# Patient Record
Sex: FEMALE | Race: WHITE | NOT HISPANIC OR LATINO | Employment: PART TIME | URBAN - METROPOLITAN AREA
[De-identification: names, ages, dates, MRNs, and addresses within clinical notes are randomized per-mention and may not be internally consistent; named-entity substitution may affect disease eponyms.]

---
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
## 2020-03-02 ENCOUNTER — OFFICE VISIT (OUTPATIENT)
Dept: FAMILY MEDICINE CLINIC | Facility: CLINIC | Age: 20
End: 2020-03-02
Payer: COMMERCIAL

## 2020-03-02 VITALS
OXYGEN SATURATION: 98 % | HEIGHT: 66 IN | HEART RATE: 56 BPM | SYSTOLIC BLOOD PRESSURE: 112 MMHG | WEIGHT: 188.2 LBS | RESPIRATION RATE: 18 BRPM | DIASTOLIC BLOOD PRESSURE: 80 MMHG | TEMPERATURE: 97.6 F | BODY MASS INDEX: 30.25 KG/M2

## 2020-03-02 DIAGNOSIS — F41.9 ANXIETY: Primary | ICD-10-CM

## 2020-03-02 DIAGNOSIS — E66.9 OBESITY (BMI 30-39.9): ICD-10-CM

## 2020-03-02 DIAGNOSIS — F32.A DEPRESSIVE DISORDER: ICD-10-CM

## 2020-03-02 DIAGNOSIS — F33.8 SEASONAL AFFECTIVE DISORDER (HCC): ICD-10-CM

## 2020-03-02 PROCEDURE — 1036F TOBACCO NON-USER: CPT | Performed by: FAMILY MEDICINE

## 2020-03-02 PROCEDURE — 99214 OFFICE O/P EST MOD 30 MIN: CPT | Performed by: FAMILY MEDICINE

## 2020-03-02 PROCEDURE — 3008F BODY MASS INDEX DOCD: CPT | Performed by: FAMILY MEDICINE

## 2020-03-02 NOTE — PROGRESS NOTES
BMI Counseling: Body mass index is 30 84 kg/m²  The BMI is above normal  Nutrition recommendations include reducing portion sizes, decreasing overall calorie intake, 3-5 servings of fruits/vegetables daily, reducing fast food intake, consuming healthier snacks and reducing intake of cholesterol  Exercise recommendations include exercising 3-5 times per week

## 2020-03-02 NOTE — PROGRESS NOTES
Assessment/Plan:  Chief Complaint   Patient presents with    Follow-up     Pt presents for a Med check  Patient Instructions   Anxiety and depression and SAD stable and will continue Sertraline 100 mg daily and call if any problems, and recheck in summer 2020  Exercise and eat healthy to help get BMI less than 25  Increase bright lights and take vitamin D3 as directed daily  No problem-specific Assessment & Plan notes found for this encounter  Diagnoses and all orders for this visit:    Anxiety    Depressive disorder    Seasonal affective disorder ([address] )    Obesity (BMI 30-39  9)          Subjective:      Patient ID: Landy Hawley is a 23 y o  female  Follow-up (Pt presents for a Med check  ) No cp or sob, or ha  Takes Sertraline 100 mg daily  Pt  With hx of SAD and anxiety and depression  The following portions of the patient's history were reviewed and updated as appropriate: allergies, current medications, past family history, past medical history, past social history, past surgical history and problem list     Review of Systems   Constitutional:        Obesity   HENT: Negative  Eyes: Negative  Respiratory: Negative  Cardiovascular: Negative  Gastrointestinal: Negative  Endocrine: Negative  Genitourinary: Negative  Musculoskeletal: Negative  Skin: Negative  Allergic/Immunologic: Negative  Neurological: Negative  Hematological: Negative  Psychiatric/Behavioral:        Anxiety and depression and SAD stable on med         Objective:      /80 (BP Location: Left arm, Patient Position: Sitting, Cuff Size: Adult)   Pulse 56   Temp 97 6 °F (36 4 °C) (Temporal)   Resp 18   Ht 5' 5 5" (1 664 m)   Wt 85 4 kg (188 lb 3 2 oz)   SpO2 98%   BMI 30 84 kg/m²          Physical Exam   Constitutional: She is oriented to person, place, and time  She appears well-developed and well-nourished  obesity   HENT:   Head: Normocephalic and atraumatic     Right Ear: External ear normal    Left Ear: External ear normal    Nose: Nose normal    Mouth/Throat: Oropharynx is clear and moist    Eyes: Pupils are equal, round, and reactive to light  Conjunctivae and EOM are normal    Neck: Normal range of motion  Neck supple  Cardiovascular: Normal rate, regular rhythm, normal heart sounds and intact distal pulses  Pulmonary/Chest: Effort normal and breath sounds normal    Musculoskeletal: Normal range of motion  Neurological: She is alert and oriented to person, place, and time  She has normal reflexes  Skin: Skin is warm and dry  Psychiatric: She has a normal mood and affect   Her behavior is normal    Anxiety and depression and SAD stable on med

## 2020-03-02 NOTE — PATIENT INSTRUCTIONS
Anxiety and depression and SAD stable and will continue Sertraline 100 mg daily and call if any problems, and recheck in summer 2020  Exercise and eat healthy to help get BMI less than 25  Increase bright lights and take vitamin D3 as directed daily

## 2020-05-04 DIAGNOSIS — F41.9 ANXIETY: ICD-10-CM

## 2020-05-04 RX ORDER — SERTRALINE HYDROCHLORIDE 100 MG/1
TABLET, FILM COATED ORAL
Qty: 90 TABLET | Refills: 3 | Status: SHIPPED | OUTPATIENT
Start: 2020-05-04 | End: 2021-07-12

## 2020-08-03 ENCOUNTER — OFFICE VISIT (OUTPATIENT)
Dept: FAMILY MEDICINE CLINIC | Facility: CLINIC | Age: 20
End: 2020-08-03
Payer: COMMERCIAL

## 2020-08-03 VITALS
RESPIRATION RATE: 16 BRPM | SYSTOLIC BLOOD PRESSURE: 110 MMHG | TEMPERATURE: 97.3 F | HEIGHT: 66 IN | WEIGHT: 183.6 LBS | DIASTOLIC BLOOD PRESSURE: 64 MMHG | OXYGEN SATURATION: 97 % | BODY MASS INDEX: 29.51 KG/M2 | HEART RATE: 87 BPM

## 2020-08-03 DIAGNOSIS — F41.9 ANXIETY: Primary | ICD-10-CM

## 2020-08-03 DIAGNOSIS — F33.8 SEASONAL AFFECTIVE DISORDER (HCC): ICD-10-CM

## 2020-08-03 DIAGNOSIS — M79.642 LEFT HAND PAIN: ICD-10-CM

## 2020-08-03 DIAGNOSIS — M25.551 RIGHT HIP PAIN: ICD-10-CM

## 2020-08-03 DIAGNOSIS — M25.532 LEFT WRIST PAIN: ICD-10-CM

## 2020-08-03 DIAGNOSIS — F32.A DEPRESSIVE DISORDER: ICD-10-CM

## 2020-08-03 PROCEDURE — 99214 OFFICE O/P EST MOD 30 MIN: CPT | Performed by: FAMILY MEDICINE

## 2020-08-03 PROCEDURE — 1036F TOBACCO NON-USER: CPT | Performed by: FAMILY MEDICINE

## 2020-08-03 PROCEDURE — 3008F BODY MASS INDEX DOCD: CPT | Performed by: FAMILY MEDICINE

## 2020-08-03 RX ORDER — MELOXICAM 7.5 MG/1
7.5 TABLET ORAL DAILY
Qty: 30 TABLET | Refills: 0 | Status: SHIPPED | OUTPATIENT
Start: 2020-08-03 | End: 2020-08-27

## 2020-08-03 NOTE — PATIENT INSTRUCTIONS
Here for right hip flexor straining after running and left wrist and hand pain and worse when doing push ups - consider orthopedic consult if not better  Trial of meloxicam prn pain  Call if not better  Take Sertraline as directed for anxiety and depression and SAD

## 2020-08-03 NOTE — PROGRESS NOTES
Assessment/Plan:  Chief Complaint   Patient presents with    Medication Management     Pt is here for med check      Patient Instructions   Here for right hip flexor straining after running and left wrist and hand pain and worse when doing push ups - consider orthopedic consult if not better  Trial of meloxicam prn pain  Call if not better  Take Sertraline as directed for anxiety and depression and SAD  No problem-specific Assessment & Plan notes found for this encounter  Diagnoses and all orders for this visit:    Anxiety    Depressive disorder    Seasonal affective disorder ([address] )    Right hip pain  -     meloxicam (MOBIC) 7 5 mg tablet; Take 1 tablet (7 5 mg total) by mouth daily    Left hand pain  -     meloxicam (MOBIC) 7 5 mg tablet; Take 1 tablet (7 5 mg total) by mouth daily    Left wrist pain  -     meloxicam (MOBIC) 7 5 mg tablet; Take 1 tablet (7 5 mg total) by mouth daily          Subjective:      Patient ID: Sanket Siddiqui is a 21 y o  female  Medication Management (Pt is here for med check ) takes Sertraline and is doing well at the 100 mg dose  Pt  Is exercising  Right hip flexor has been sore since running  The following portions of the patient's history were reviewed and updated as appropriate: allergies, current medications, past family history, past medical history, past social history, past surgical history and problem list     Review of Systems   Constitutional: Negative  HENT: Negative  Eyes: Negative  Respiratory: Negative  Cardiovascular: Negative  Gastrointestinal: Negative  Endocrine: Negative  Genitourinary: Negative  Musculoskeletal: Negative  Skin: Negative  Allergic/Immunologic: Negative  Neurological: Negative  Hematological: Negative  Psychiatric/Behavioral: Negative            Objective:      /64   Pulse 87   Temp (!) 97 3 °F (36 3 °C) (Temporal)   Resp 16   Ht 5' 5 5"   Wt 83 3 kg (183 lb 9 6 oz)   SpO2 97%   BMI 30 09 kg/m²          Physical Exam   Constitutional: She is oriented to person, place, and time  She appears well-developed  HENT:   Head: Normocephalic and atraumatic  Right Ear: External ear normal    Left Ear: External ear normal    Nose: Nose normal    Eyes: Pupils are equal, round, and reactive to light  Conjunctivae are normal    Neck: Normal range of motion  Neck supple  Cardiovascular: Normal rate, regular rhythm and normal heart sounds  Pulmonary/Chest: Effort normal and breath sounds normal    Musculoskeletal: Normal range of motion  Neurological: She is alert and oriented to person, place, and time  She has normal reflexes  Skin: Skin is warm and dry     Psychiatric: Her behavior is normal

## 2020-08-27 DIAGNOSIS — M25.532 LEFT WRIST PAIN: ICD-10-CM

## 2020-08-27 DIAGNOSIS — M79.642 LEFT HAND PAIN: ICD-10-CM

## 2020-08-27 DIAGNOSIS — M25.551 RIGHT HIP PAIN: ICD-10-CM

## 2020-08-27 RX ORDER — MELOXICAM 7.5 MG/1
TABLET ORAL
Qty: 30 TABLET | Refills: 0 | Status: SHIPPED | OUTPATIENT
Start: 2020-08-27 | End: 2021-07-12

## 2020-10-21 ENCOUNTER — TELEPHONE (OUTPATIENT)
Dept: FAMILY MEDICINE CLINIC | Facility: CLINIC | Age: 20
End: 2020-10-21

## 2020-10-21 ENCOUNTER — APPOINTMENT (OUTPATIENT)
Dept: URGENT CARE | Age: 20
End: 2020-10-21
Payer: COMMERCIAL

## 2020-10-21 ENCOUNTER — TRANSCRIBE ORDERS (OUTPATIENT)
Dept: URGENT CARE | Age: 20
End: 2020-10-21

## 2020-10-21 ENCOUNTER — APPOINTMENT (OUTPATIENT)
Dept: LAB | Age: 20
End: 2020-10-21
Payer: COMMERCIAL

## 2020-10-21 DIAGNOSIS — Z00.8 SPECIAL EXAM: ICD-10-CM

## 2020-10-21 DIAGNOSIS — Z00.8 SPECIAL EXAM: Primary | ICD-10-CM

## 2020-10-21 PROCEDURE — 86480 TB TEST CELL IMMUN MEASURE: CPT

## 2020-10-21 PROCEDURE — 36415 COLL VENOUS BLD VENIPUNCTURE: CPT

## 2020-10-22 LAB
GAMMA INTERFERON BACKGROUND BLD IA-ACNC: 0.02 IU/ML
M TB IFN-G BLD-IMP: NEGATIVE
M TB IFN-G CD4+ BCKGRND COR BLD-ACNC: 0 IU/ML
M TB IFN-G CD4+ BCKGRND COR BLD-ACNC: 0 IU/ML
MITOGEN IGNF BCKGRD COR BLD-ACNC: >10 IU/ML

## 2020-12-08 ENCOUNTER — OFFICE VISIT (OUTPATIENT)
Dept: FAMILY MEDICINE CLINIC | Facility: CLINIC | Age: 20
End: 2020-12-08
Payer: COMMERCIAL

## 2020-12-08 VITALS
BODY MASS INDEX: 30.63 KG/M2 | RESPIRATION RATE: 17 BRPM | OXYGEN SATURATION: 98 % | HEART RATE: 68 BPM | SYSTOLIC BLOOD PRESSURE: 102 MMHG | DIASTOLIC BLOOD PRESSURE: 78 MMHG | HEIGHT: 66 IN | TEMPERATURE: 97 F | WEIGHT: 190.6 LBS

## 2020-12-08 DIAGNOSIS — F41.9 ANXIETY: ICD-10-CM

## 2020-12-08 DIAGNOSIS — L98.9 FACIAL SKIN LESION: Primary | ICD-10-CM

## 2020-12-08 DIAGNOSIS — F33.8 SEASONAL AFFECTIVE DISORDER (HCC): ICD-10-CM

## 2020-12-08 DIAGNOSIS — F32.A DEPRESSIVE DISORDER: ICD-10-CM

## 2020-12-08 PROCEDURE — 99213 OFFICE O/P EST LOW 20 MIN: CPT | Performed by: FAMILY MEDICINE

## 2020-12-08 PROCEDURE — 1036F TOBACCO NON-USER: CPT | Performed by: FAMILY MEDICINE

## 2020-12-08 PROCEDURE — 3008F BODY MASS INDEX DOCD: CPT | Performed by: FAMILY MEDICINE

## 2020-12-08 RX ORDER — MOMETASONE FUROATE 50 UG/1
2 SPRAY, METERED NASAL DAILY
COMMUNITY
Start: 2020-09-21

## 2020-12-08 RX ORDER — ALBUTEROL SULFATE 90 UG/1
AEROSOL, METERED RESPIRATORY (INHALATION)
COMMUNITY
Start: 2020-09-21

## 2020-12-08 RX ORDER — OLOPATADINE HYDROCHLORIDE 1 MG/ML
SOLUTION/ DROPS OPHTHALMIC
COMMUNITY
Start: 2020-09-21

## 2020-12-28 ENCOUNTER — IMMUNIZATIONS (OUTPATIENT)
Dept: FAMILY MEDICINE CLINIC | Facility: HOSPITAL | Age: 20
End: 2020-12-28
Payer: COMMERCIAL

## 2020-12-28 DIAGNOSIS — Z23 ENCOUNTER FOR IMMUNIZATION: ICD-10-CM

## 2020-12-28 PROCEDURE — 0001A SARS-COV-2 / COVID-19 MRNA VACCINE (PFIZER-BIONTECH) 30 MCG: CPT

## 2020-12-28 PROCEDURE — 91300 SARS-COV-2 / COVID-19 MRNA VACCINE (PFIZER-BIONTECH) 30 MCG: CPT

## 2021-01-18 ENCOUNTER — IMMUNIZATIONS (OUTPATIENT)
Dept: FAMILY MEDICINE CLINIC | Facility: HOSPITAL | Age: 21
End: 2021-01-18

## 2021-01-18 DIAGNOSIS — Z23 ENCOUNTER FOR IMMUNIZATION: Primary | ICD-10-CM

## 2021-01-18 PROCEDURE — 0002A SARS-COV-2 / COVID-19 MRNA VACCINE (PFIZER-BIONTECH) 30 MCG: CPT

## 2021-01-18 PROCEDURE — 91300 SARS-COV-2 / COVID-19 MRNA VACCINE (PFIZER-BIONTECH) 30 MCG: CPT

## 2021-04-29 LAB
EXTERNAL CHLAMYDIA RESULT: NEGATIVE
N GONORRHOEA RRNA SPEC QL PROBE: NEGATIVE

## 2021-05-25 ENCOUNTER — TELEPHONE (OUTPATIENT)
Dept: PSYCHIATRY | Facility: CLINIC | Age: 21
End: 2021-05-25

## 2021-05-25 ENCOUNTER — TELEPHONE (OUTPATIENT)
Dept: FAMILY MEDICINE CLINIC | Facility: CLINIC | Age: 21
End: 2021-05-25

## 2021-05-25 DIAGNOSIS — F32.A DEPRESSIVE DISORDER: ICD-10-CM

## 2021-05-25 DIAGNOSIS — F41.9 ANXIETY: Primary | ICD-10-CM

## 2021-05-25 NOTE — TELEPHONE ENCOUNTER
Pt called looking to set up w/ a psychiatrist  I informed pt  call ANA ROSA Alvarez to put in a referral in the sytem   Once in I will forward information to Intake

## 2021-05-25 NOTE — TELEPHONE ENCOUNTER
Patient is looking for any recommendations for Psychiatrist's, please advise patient at [phone]  none

## 2021-05-26 ENCOUNTER — TELEPHONE (OUTPATIENT)
Dept: PSYCHIATRY | Facility: CLINIC | Age: 21
End: 2021-05-26

## 2021-05-27 ENCOUNTER — TELEPHONE (OUTPATIENT)
Dept: PSYCHIATRY | Facility: CLINIC | Age: 21
End: 2021-05-27

## 2021-05-27 NOTE — TELEPHONE ENCOUNTER
Patient called and would like to set up an apt she has a referral on file can you please give her a call thank you

## 2021-06-09 ENCOUNTER — TELEPHONE (OUTPATIENT)
Dept: PSYCHIATRY | Facility: CLINIC | Age: 21
End: 2021-06-09

## 2021-06-09 NOTE — TELEPHONE ENCOUNTER
Mom called and would like to set up an apt for Ben Marie I did make her aware that intake will need to speak with the patient to schedule the apt she said you can contact her before 2:00

## 2021-06-14 ENCOUNTER — TELEPHONE (OUTPATIENT)
Dept: PSYCHIATRY | Facility: CLINIC | Age: 21
End: 2021-06-14

## 2021-06-14 NOTE — TELEPHONE ENCOUNTER
Behavorial Health Outpatient Intake Questions    Referred by: PCP    Please advised interviewee that they need to answer all questions truthfully to allow for best care and any misrepresentations of information may affect their ability to be seen at this clinic   => Was this discussed? Yes     [facility]     Presenting Problem (in patient's words): depression, mood swings, episodes of "not being myself"  Are there any developmental disabilities? ? If yes, can they speak to you on the phone? If they are too limited to speak to you on phone, refer out No    Are you taking any psychiatric medications? No    => If yes, who prescribes? If yes, are they injectable medications? Does the patient have a language barrier or hearing impairment? No    Have you been treated at [facility] by a therapist or a doctor in the past? If yes, who? No    Has the patient been hospitalized for mental health? No   If yes, how long ago was last hospitalization and where was it? Do you actively use alcohol or marijuana or illegal substances? If yes, what and how much - refer out to Drug and alcohol treatment if use is excessive or daily use of illegal substances No concerns of substance abuse are reported  Do you have a community treatment team or ? No    Legal History-     Does the patient have any history of arrests, assisted/halfway time, or DUIs? No  If Yes-  1) What types of charges? 2) When were they last incarcerated? 3) Are they currently on parole or probation? Minor Child-    Who has custody of the child? Is there a custody agreement? If there is a custody agreement remind parent that they must bring a copy to the first appt or they will not be seen       Intake Team, please check with provider before scheduling if flags come up such as:  - complex case  - legal history (other than DUI)  - communication barrier concerns are present  - if, in your judgment, this needs further review    ACCEPTED as a patient Yes  => Appointment Date: 07/12/2021 w/ DR Ubaldo Coopering    Referred Elsewhere? No    Name of Insurance Co: [employer] [place] [address] [place] ID# [id number]  [id number] Phone #  If ins is primary or secondary  If patient is a minor, parents information such as Name, D  O B of guarantor

## 2021-07-12 ENCOUNTER — OFFICE VISIT (OUTPATIENT)
Dept: PSYCHIATRY | Facility: CLINIC | Age: 21
End: 2021-07-12
Payer: COMMERCIAL

## 2021-07-12 VITALS
DIASTOLIC BLOOD PRESSURE: 71 MMHG | BODY MASS INDEX: 27.58 KG/M2 | WEIGHT: 182 LBS | HEIGHT: 68 IN | SYSTOLIC BLOOD PRESSURE: 112 MMHG

## 2021-07-12 DIAGNOSIS — E55.9 VITAMIN D DEFICIENCY: ICD-10-CM

## 2021-07-12 DIAGNOSIS — F17.200 NICOTINE USE DISORDER: ICD-10-CM

## 2021-07-12 DIAGNOSIS — F33.0 MAJOR DEPRESSIVE DISORDER, RECURRENT, MILD (HCC): Primary | ICD-10-CM

## 2021-07-12 DIAGNOSIS — F42.9 OBSESSIVE-COMPULSIVE DISORDER, UNSPECIFIED TYPE: ICD-10-CM

## 2021-07-12 PROCEDURE — 90792 PSYCH DIAG EVAL W/MED SRVCS: CPT | Performed by: STUDENT IN AN ORGANIZED HEALTH CARE EDUCATION/TRAINING PROGRAM

## 2021-07-12 RX ORDER — DESVENLAFAXINE 50 MG/1
50 TABLET, EXTENDED RELEASE ORAL DAILY
Qty: 30 TABLET | Refills: 1 | Status: SHIPPED | OUTPATIENT
Start: 2021-07-12 | End: 2021-07-19

## 2021-07-12 NOTE — PSYCH
Name and Date of Birth:  Kendy Roque 24 y o  2000    Date of Treatment Plan: July 12, 2021    Diagnosis/Diagnoses:     1  Major depressive disorder, recurrent, mild (HCC)    2  Obsessive-compulsive disorder, unspecified type    3  Nicotine use disorder    4  Vitamin D deficiency      Strengths/Personal Resources for Self-Care: ability to adapt to life changes, ability to communicate well, ability to listen, ability to reason, average or above intelligence, financial means, general fund of knowledge, good physical health, good understanding of illness, independence, ability to negotiate basic needs, self-reliance, well educated, willingness to work on problems, work skills  Area/Areas of need (in own words): depressive symptoms, mood swings, obsessive-compulsive symptoms  1  Long Term Goal: decrease depression, improve mood stability, decrease obsessive thoughts  Target Date: 6 months - 1/12/2022  Person/Persons responsible for completion of goal: Pedro Luis Connor    2  Short Term Objective (s) - How will we reach this goal?:     A  Provider new recommended medication/dosage changes and/or continue medication(s): continue current medications as prescribed  B  Increase socialization with peers  C   Attend medication management appointments regularly  Target Date: 6 months - 1/12/2022  Person/Persons Responsible for Completion of Goal: Pedro Luis Connor     Progress Towards Goals: initiating treatment    Treatment Modality: medication management every 1 month    Review due 90 to 120 days from date of this plan: 4 months - 11/12/2021  Expected length of service: ongoing treatment unless revised    My Physician/PA/NP and I have developed this plan together and I agree to work on the goals and objectives  I understand the treatment goals that were developed for my treatment      Signature:       Date and time:  Signature of parent/guardian if under age of 15 years: Date and time:  Signature of provider:      Date and time:  Signature of Supervising Physician:    Date and time: 7/12/2021      Andrew Edwards DO

## 2021-07-12 NOTE — PSYCH
Outpatient Psychiatry Intake Exam       PCP: Millie Villavicencio DO    Referral source: PCP    Identifying information:  Johnson Galeas is a 24 y o  female with a psychiatric history of depression and anxiety here for evaluation and determination of mental health management needs  Sources of information:   Information for this evaluation was gathered through direct interview with the patient  Confidentiality discussion: Limits of confidentiality in cases of safety concerns involving self and others as well as this physician's role as a mandatory  of abuse  They voiced understanding and a desire to continue with the evaluation  SUBJECTIVE     Chief Complaint / reason for visit: " Feeling depressed with mood swings "    History of Present Illness:    Johnson Galeas is a 24 y o  female with a psychiatric history of depression and anxiety and medical history of hyperhidrosis, kyphosis, and vitamin D deficiency who presents here for evaluation and determination of mental health management needs  Patient reports increased depression, mood swings, and obsessive thinking which has been worsening since November when she discontinued her Zoloft that she had been on for approximately 4 years as it was making her feel like a zombie and motions  Reports that mood swings began to get worse around more urge in she had 1 episode of impulsively breaking up with her boyfriend of 4 years over nothing which she later regretted and subsequently restarted their relationship  Patient denies ever requiring inpatient psychiatric hospitalization or seeing an outpatient psychiatrist   Does state that she was in therapy 3 years ago for a period of 3 months which she did not find to be helpful  Patient denies any history of suicide attempts or history of self-harm  Patient's only psychotropic medication she has ever been on is Zoloft and is interested in trying a new medication  Patient states that her current mood is regular    Does admit to depression off and on over the last 2 weeks  Does admit to feelings of hopelessness and helplessness but denies any feelings of worthlessness or guilt  Does report good quantity of sleep of approximately 8 hours per night but with difficulty with initiation and has a requirement of having a video playing prior to going to sleep  Does admit to significant anhedonia and not enjoying activities she used to which include cooking, baking, playing with her dog, and spending time with her boyfriend and mother  Does admit to decreased energy level and decreased concentration with school this year that she attends online and has been doing so for the past 5 years  Denies any changes in memory, appetite, or weight  Patient denies any suicidal ideations or homicidal ideations  Denies any auditory visual hallucinations now or in the past   Does admit to having some periods of distractibility with racing thoughts, irritability, euphoria, and increased goal-directed activity for a period of 3 days at a time  Also admits to significant OCD like symptoms such as brushing her teeth and washing her face at night in the bathroom with the door closed  If this ritual is interrupted she must start all over again and reports that she freaks out at whoever interrupts her  Also reports a requirement of needing to check her car 3 times after leaving at  Indicates there are other episodes of cleaning, checking the stove, and locking doors which she must engage in or she is unable to control subsequent anxiety from this  Denies anxiety in general or history of panic attacks outside of these performed rituals  Patient denies any history of abuse, trauma, or PTSD symptoms  Denies any eating disorders  Denies any history of seizures or head trauma  Patient has a boyfriend of 4 years  Patient lives in [place], [place] with her mother in house    Reports that she has 2 half brothers ages 27 and 35 whom she has a rough relationship with an generally coexist with them    Does report having a close relationship with younger brother for a period of time prior to him moving to [place] which is now less close  Patient is a full-time student at [employer] where she is in a pre PA program in biology and in her senior year  Patient has no children of her own  Reports that her father left the family when she was 3to 1years old and now lives in [place] whom she has limited contact with and everybody in the family reportedly does not get along with  Reports she sees her father every 2-3 months  Patient denies legal history  Denies  history or access to firearms  Family psychiatric history significant for father with depression and opiate use disorder/alcohol use disorder  Reports all her grand parents have alcohol use disorder  Reports that she drinks occasionally approximately 3 [place] drinks once a week  Denies any blackouts, DUIs, need for detox/rehab  Reports she did smoke marijuana in high school but has not done so since that time  Denies any other illicit drug use  Denies tobacco use  Does admit to significant vaping throughout the day for the last 5 years  States that she discontinued this approximately 1 month ago which has been difficult for her to deal with  Reports drinking approximately 2 cups of coffee per day  Onset of symptoms was a few months ago with gradually worsening course since that time  Stressors: 1) unstable relationship with father 2) unstable relationship with brothers 3) death anxiety 4) anniversary of grandmother's passing 1 year ago    HPI ROS:  Medication Side Effects: none  Depression: 6-9 /10 (10 worst)  Anxiety: 4 /10 (10 worst)  Safety (SI, HI, other): Denies SI/HI/AVH  Sleep (NM = Nightmares): 8+ hours but difficulty with initiation and requiring a video to be played  Energy:  Decreased  Appetite:   Within normal limits  Weight Change:  Denies    PHQ-9 Follow-up    Little interest or pleasure in doing things: 2 - more than half the days  Feeling down, depressed, or hopeless: 3 - nearly every day  Trouble falling or staying asleep, or sleeping too much: 3 - nearly every day  Feeling tired or having little energy: 2 - more than half the days  Poor appetite or overeatin - not at all  Feeling bad about yourself - or that you are a failure or have let yourself or your family down: 1 - several days  Trouble concentrating on things, such as reading the newspaper or watching television: 1 - several days  Moving or speaking so slowly that other people could have noticed  Or the opposite - being so fidgety or restless that you have been moving around a lot more than usual: 0 - not at all  Thoughts that you would be better off dead, or of hurting yourself in some way: 0 - not at all  PHQ-2 Score: 5  PHQ-9 Score: 12         PHQ-9 Score (since 2021)     PHQ-9 Score  12          In terms of depression, the patient endorses loss of interests/pleasure, depressed mood, loss of energy, trouble concentrating, change in psychomotor activity, and hopelessness  In terms of bipolar disorder, the patient endorses history of periods of elevated mood, history of periods of irritable mood, history of mood swings, but no clear history of full hypomanic, manic or mixed episodes, lasting several days in a row   Symptoms include inflated self-esteem or grandiosity , Irritability, flight of ideas/racing thoughts , distractibility, increased goal-directed behavior, psychomotor agitation and symptoms have been significant and present for 3 days    BERTA symptoms: excessive worry more days than not for longer than 3 months, difficulty concentrating, irritable and restlessness/keyed up  Panic Disorder symptoms: no symptoms suggestive of panic disorder  Social Anxiety symptoms: no symptoms suggestive of social anxiety  OCD Symptoms: (Obsession 2/2) AND the individual trys to ingnore or suppress these with some other thought or action, (Compulsion 2/2) AND these are aimed to reduce anxiety or distress or some dreaded event  HOWEVER, thees are not linked realistically or are clearly excessive, these obessions or compulsions are time consuming or cause significant distress or impairment, checking, rituals  Eating Disorder symptoms: no historical or current eating disorder  no binge eating disorder; no anorexia nervosa  no symptoms of bulimia    In terms of PTSD, the patient endorses exposure to trauma involving: Denies; intrusive symptoms including (1+): no intrusive symptoms; avoidance symptoms including (1+): no avoidance symptoms; Negative alterations including (2+): no negative alteration symptoms; hyperarousal symptoms including (2+): no arousal symptoms  In terms of psychotic symptoms, the patient reports no psychotic symptoms now or in the past     Past Psychiatric History  Previous diagnoses include: Anxiety and depression  Prior outpatient psychiatric treatment: None  Prior therapy: 3 months, 3 years ago  Disliked it  Prior inpatient psychiatric treatment: Denies  Prior suicide attempts: Denies  Prior self harm: Denies  Prior violence or aggression: Denies    Previous psychotropic medication trials:     Antidepressants: Zoloft    Mood Stabilizers: None    Anxiolytics: None    Typical antipsychotics: None    Atypical antipsychotics: None    Hypnotics/sleep aids: None    Social History:    Childhood was described as "good, mom made up for dad"  During childhood, mom supported patient while father abandoned her and participated in drug activity  They have 0 sister(s) and 2 half brother(s)  Patient is 3 in birth order    Abuse/neglect: none    As far as the patient (or present family member) is aware, overall childhood development: Patient does ascribe to normal developmental including childhood as well as regular educational course overall (eg no IEP, special education)      Education level: Senior year of college at [employer]   Current occupation: Student and part-time CNA  Marital status: Single, Boyfriend  Children: None  Current Living Situation: the patient currently lives with mother in [place], [place]   Social support: Mom, few friends    [employer] Affiliation: None   experience: Denies  Legal history: Denies  Access to Guns: Denies    Substance use and treatment:  Tobacco use: Denies but does use vape for past 5 years  Quit 1 month ago  Caffeine Use: 2 cups/day  ETOH use: occasional   Other substance use: Denies  Endorses previous experimentation with: MJ in high school    Longest clean time: 3 months clean from vaping  History of Inpatient/Outpatient rehabilitation program: no      Traumatic History:     Abuse: none  Other Traumatic Events: none     Family Psychiatric History:     Psychiatric Illness:  Father - depression  Substance Abuse: All grandparents alcohol use disorder  Suicide Attempts:  no family history of suicide attempts    Family History   Problem Relation Age of Onset    Depression Father             Past Medical / Surgical History:    Current PCP: Lance Munroe DO   Other providers include: Unknown    Patient Active Problem List   Diagnosis    Hyperhidrosis    Kyphosis    Seasonal affective disorder ([facility] [address] )    Vitamin D deficiency    Chronic nonintractable headache    Obsessive compulsive disorder    Nicotine use disorder       Past Medical History-  Past Medical History:   Diagnosis Date    Allergy     last assessed: 12/4/2013    Anxiety     Depression     Dysuria     last assessed: 3/5/2014    Kyphosis, acquired     last assessed: 1/17/2018    Murmur     last assessed: 6/24/2014          History of Seizures: no  History of Head injury-LOC-Concussion: no    Past Surgical History-  No past surgical history on file         Allergies: Pineapple and Sulfa  Allergies   Allergen Reactions    Pineapple - Food Allergy      Other reaction(s): Tounge and throat swell    Sulfa Antibiotics Rash and Hives       Recent labs:  No visits with results within 1 Month(s) from this visit  Latest known visit with results is:   Appointment on 10/21/2020   Component Date Value    QFT Nil 10/21/2020 0 02     QFT TB1-NIL 10/21/2020 0 00     QFT TB2-NIL 10/21/2020 0 00     QFT Mitogen-NIL 10/21/2020 >10 00     QFT Final Interpretation 10/21/2020 Negative      Labs were reviewed and discussed with patient    Medical Review Of Systems:    Patient admits to no physical complaints; otherwise A comprehensive review of systems was negative  Medications:  Current Outpatient Medications on File Prior to Visit   Medication Sig Dispense Refill    albuterol (PROVENTIL HFA,VENTOLIN HFA) 90 mcg/act inhaler INHALE 2 PUFFS 20 MIN PRIOR TO EXERCISE AND UPTO EVERY 4 HOURS AS NEEDED      mometasone (NASONEX) 50 mcg/act nasal spray 2 sprays daily      olopatadine (PATANOL) 0 1 % ophthalmic solution instill 1 drop into both eyes twice a day if needed      [DISCONTINUED] meloxicam (MOBIC) 7 5 mg tablet take 1 tablet by mouth once daily with food or milk (Patient not taking: Reported on 12/8/2020) 30 tablet 0    [DISCONTINUED] sertraline (ZOLOFT) 100 mg tablet take 1 tablet by mouth once daily 90 tablet 3     No current facility-administered medications on file prior to visit  Medication Compliant? Yes    All current active medications have been reviewed      Objective     OBJECTIVE     /71   Ht 5' 8 11" (1 73 m)   Wt 82 6 kg (182 lb)   BMI 27 58 kg/m²     MENTAL STATUS EXAM  Appearance:  dressed casually, overweight, looks older than stated age   Behavior:  Pleasant & cooperative, guarded, fair eye contact, psychomotor retardation   Speech:  soft, monotone   Mood:  regular   Affect:  mood incongruent, depressed, constricted, anxious   Language: intact and appropriate for age, education, and intellect   Thought Process:  Linear and goal directed   Associations: intact associations   Thought Content:  negative thinking and cognitive distortions, obsessive   Perceptual Disturbances: no auditory or visual hallcunations   Risk Potential / Abnormal Thoughts: Suicidal ideation - None  Homicidal ideation - None  Potential for aggression - No       Consciousness:  Alert & Awake   Sensorium:  Grossly oriented   Attention: attention span and concentration are age appropriate   Intellect: grossly normal   Fund of Knowledge:  Memory: awareness of current events: yes  recent and remote memory grossly intact   Insight:  limited   Judgment: improving   Muscle Strength Muscle Tone: normal  normal   Gait/Station: normal gait/station with good balance   Motor Activity: no abnormal movements     Pain none   Pain Scale 0     IMPRESSIONS/FORMULATION          Diagnoses and all orders for this visit:    Major depressive disorder, recurrent, mild (HCC)  -     Ambulatory referral to Psychiatry  -     desvenlafaxine succinate (PRISTIQ) 50 mg 24 hr tablet; Take 1 tablet (50 mg total) by mouth daily  -     Vitamin D 25 hydroxy; Future    Obsessive-compulsive disorder, unspecified type    Nicotine use disorder    Vitamin D deficiency        1  Major depressive disorder, recurrent, mild (HCC)    2  Obsessive-compulsive disorder, unspecified type    3  Nicotine use disorder    4  Vitamin D deficiency        Confidential Assessment:      Luz Maria Flores is a 24 y o  female who presents with symptoms supporting the aforementioned  Differential includes Major Depressive Disorder, recurrent, mild, OCD uspecified, nicotine use disorder, bipolar 2 disorder, and ADHD  I do not believe the patient has bipolar disorder as most of her symptoms can be explained by her OCD or depressive symptoms  ADHD remains on the differential due to concentration problems at school and further assessment of this will be done moving forward   Pristiq is a suitable medication to start with this patient as it is indicated for depression and OCD with easy dosing and minimal risk of discontinuation  Patient declined nicotine replacement therapy  Will order outpatient vitamin D level to check prior to next patient appointment  Suicide / Homicide / Safety risk assessment:    Risk of Harm to Self:  The following ratings are based on assessment at the time of the interview  Demographic risk factors include: , never , age: young adult (15-24)  Historical Risk Factors include: chronic depressive symptoms, history of substance use  Recent Specific Risk Factors include: current depressive symptoms, unstable mood, hopelessness  Protective Factors: ability to adapt to change, able to manage anger well, compliant with mental health treatment, effective coping skills, good self-esteem, stable job, sobriety  Weapons: none  The following steps have been taken to ensure weapons are properly secured: not applicable  Based on today's assessment, Katherine Mayen presents the following risk of harm to self: minimal    Risk of Harm to Others:   No HI  The following interventions are recommended: no intervention changes needed  Although patient's acute lethality risk is LOW, long-term/chronic lethality risk is mildly elevated given depressive symptoms and tobacco use disorder but without any prior history of suicidal thoughts/actions  However, at the current moment, Katherine Mayen is future-oriented, forward-thinking, and demonstrates ability to act in a self-preserving manner as evidenced by volitionally presenting to the clinic today, seeking treatment  Additionally, Katherine Mayen sits throughout the assessment wearing personal protective gear (ie mask) in the context of an ongoing viral pandemic, suggesting a will and desire to live  At this juncture, inpatient hospitalization is not currently warranted   To mitigate future risk, patient should adhere to treatment recommendations, avoid alcohol/illicit substance use, utilize community-based resources and familiar support,

## 2021-07-13 ENCOUNTER — TELEPHONE (OUTPATIENT)
Dept: PSYCHIATRY | Facility: CLINIC | Age: 21
End: 2021-07-13

## 2021-07-13 NOTE — TELEPHONE ENCOUNTER
Prior auth submitted to [facility] via [employer], I called and informed Kandi Chao prior [facility] was submitted and I will notify her once I receive the response

## 2021-07-16 NOTE — TELEPHONE ENCOUNTER
Received fax from [address] of prior authorization for Desvenlafaxine  They require one more trial of an alternative antidepressant         Please review

## 2021-07-19 RX ORDER — VENLAFAXINE HYDROCHLORIDE 75 MG/1
75 CAPSULE, EXTENDED RELEASE ORAL DAILY
Qty: 30 CAPSULE | Refills: 0 | Status: SHIPPED | OUTPATIENT
Start: 2021-07-19 | End: 2021-08-16

## 2021-07-19 RX ORDER — VENLAFAXINE HYDROCHLORIDE 37.5 MG/1
37.5 CAPSULE, EXTENDED RELEASE ORAL DAILY
Qty: 30 CAPSULE | Refills: 0 | Status: SHIPPED | OUTPATIENT
Start: 2021-07-19 | End: 2021-07-19

## 2021-07-19 NOTE — TELEPHONE ENCOUNTER
Patient was contacted about the denial of insurance for Pristiq and was agreeable to starting on Effexor instead due to generic nature and affordability  Did review risk for discontinuation syndrome and side effect profile with patient which is similar to Pristiq  She will  medications at her pharmacy

## 2021-07-20 ENCOUNTER — APPOINTMENT (OUTPATIENT)
Dept: LAB | Facility: CLINIC | Age: 21
End: 2021-07-20
Payer: COMMERCIAL

## 2021-07-20 DIAGNOSIS — F33.0 MAJOR DEPRESSIVE DISORDER, RECURRENT, MILD (HCC): ICD-10-CM

## 2021-07-20 LAB — 25(OH)D3 SERPL-MCNC: 31.8 NG/ML (ref 30–100)

## 2021-07-20 PROCEDURE — 36415 COLL VENOUS BLD VENIPUNCTURE: CPT

## 2021-07-20 PROCEDURE — 82306 VITAMIN D 25 HYDROXY: CPT

## 2021-07-22 ENCOUNTER — TELEPHONE (OUTPATIENT)
Dept: FAMILY MEDICINE CLINIC | Facility: CLINIC | Age: 21
End: 2021-07-22

## 2021-08-16 ENCOUNTER — OFFICE VISIT (OUTPATIENT)
Dept: PSYCHIATRY | Facility: CLINIC | Age: 21
End: 2021-08-16
Payer: COMMERCIAL

## 2021-08-16 ENCOUNTER — TELEPHONE (OUTPATIENT)
Dept: PSYCHIATRY | Facility: CLINIC | Age: 21
End: 2021-08-16

## 2021-08-16 VITALS — SYSTOLIC BLOOD PRESSURE: 120 MMHG | DIASTOLIC BLOOD PRESSURE: 84 MMHG | WEIGHT: 177 LBS | BODY MASS INDEX: 26.83 KG/M2

## 2021-08-16 DIAGNOSIS — F33.9 DEPRESSION, RECURRENT (HCC): ICD-10-CM

## 2021-08-16 DIAGNOSIS — F33.0 MAJOR DEPRESSIVE DISORDER, RECURRENT, MILD (HCC): Primary | ICD-10-CM

## 2021-08-16 DIAGNOSIS — F42.9 OBSESSIVE-COMPULSIVE DISORDER, UNSPECIFIED TYPE: ICD-10-CM

## 2021-08-16 PROCEDURE — 99215 OFFICE O/P EST HI 40 MIN: CPT | Performed by: PSYCHIATRY & NEUROLOGY

## 2021-08-16 PROCEDURE — 3725F SCREEN DEPRESSION PERFORMED: CPT | Performed by: PSYCHIATRY & NEUROLOGY

## 2021-08-16 PROCEDURE — 1036F TOBACCO NON-USER: CPT | Performed by: PSYCHIATRY & NEUROLOGY

## 2021-08-16 RX ORDER — VENLAFAXINE HYDROCHLORIDE 75 MG/1
150 CAPSULE, EXTENDED RELEASE ORAL DAILY
Qty: 30 CAPSULE | Refills: 1 | Status: SHIPPED | OUTPATIENT
Start: 2021-08-16 | End: 2021-09-14

## 2021-08-16 NOTE — PSYCH
MEDICATION MANAGEMENT NOTE        [address]      Name and Date of Birth:  Amina Morales 24 y o  2000    Date of Visit: August 16, 2021    SUBJECTIVE:  CC: Dotty Home presents today for follow up with OCD and MDD, recurrent, mild  Patient does report some mild improvement with her energy and receiving "small boost"  from the medications but does state that her anxiety and depression have mostly remained the same  Endorses that concentration continues to be a problem but has not caused overtly negative consequences in her life since it is summer and she has not needed the ability to concentrate like she suspects she will need in the next couple months  States that she is moving back to school in to her own apartment in 5 days  States that this is "bitter sweet" as her summer has concluded  States future plans of wanting to obtain a physician's assistance license and going to a post baccalaureate program   Reports that 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 and she is now transitioning to per [employer] shifts  Reports that her OCD symptoms have not been good as she continues with skin picking and some hair pulling although she does not show obvious excoriations  Reports that she no longer feels like a zombie on Zoloft and denies any overt side effects to Effexor  Reports that she has continued her cessation of smoking but does report some anxiety about possibly starting if she gets anxious at school  Reports that she would like to start psychotherapy when this is available and agrees to being on wait list     Dotty Russell denies any side effects from medications unless noted above    Since our last visit, overall symptoms have been gradually improving       HPI ROS:       PHQ-9 Follow-up    Little interest or pleasure in doing things: 2 - more than half the days  Feeling down, depressed, or hopeless: 3 - nearly every day  Trouble falling or staying asleep, or sleeping too much: 2 - more than half the days  Feeling tired or having little energy: 2 - more than half the days  Poor appetite or overeatin - not at all  Feeling bad about yourself - or that you are a failure or have let yourself or your family down: 0 - not at all  Trouble concentrating on things, such as reading the newspaper or watching television: 2 - more than half the days  Moving or speaking so slowly that other people could have noticed  Or the opposite - being so fidgety or restless that you have been moving around a lot more than usual: 0 - not at all  Thoughts that you would be better off dead, or of hurting yourself in some way: 0 - not at all  PHQ-2 Score: 5  PHQ-9 Score: 11         PHQ-9 Score (since 2021)     PHQ-9 Score  11            BERTA-7 Flowsheet Screening      Most Recent Value   Over the last 2 weeks, how often have you been bothered by any of the following problems? Feeling nervous, anxious, or on edge  1   Not being able to stop or control worrying  2   Worrying too much about different things  3   Trouble relaxing  1   Being so restless that it is hard to sit still  2   Becoming easily annoyed or irritable  3   Feeling afraid as if something awful might happen  1   BERTA-7 Total Score  13              HPI ROS:             ('was' notes: recent => remote)  Medication Side Effects:  Denies  None   Depression (10 worst): 8 (Was 6-9)   Anxiety (10 worst): 7 (Was 4)   Safety concerns (SI, HI, etc): Denies SI/HI/AVH (Was Denies SI/HI/AVH)   Sleep: (NM = Nightmares) 9 hours  Now with guided medication  More rested  (Was 8+ hours but difficulty with intitation and requiring a video to be played)   Energy: Fluctuating  (Was Decreased)   Appetite: WNL (Was WNL)   Weight Change: Denies Denies       Review Of Systems as noted above   In addition:     Constitutional negative   ENT negative   Cardiovascular negative   Respiratory negative   Gastrointestinal negative   Genitourinary negative Musculoskeletal negative   Integumentary negative   Neurological negative   Endocrine negative   Other Symptoms none     Pain none   Pain Scale 0     History Review: The following portions of the patient's history were reviewed and updated as appropriate: allergies, current medications, past family history, past medical history, past social history, past surgical history and problem list      Lab Review: Labs were reviewed and discussed with patient, I note that  Vitamin D level was WNL      OBJECTIVE:     MENTAL STATUS EXAM  Appearance:  age appropriate, dressed casually, overweight, looks older than stated age   Behavior:  Pleasant & cooperative, guarded, fair eye contact, psychomotor retardation, apathetic   Speech:  soft, monotone   Mood:  "It's me against myself "   Affect:  mood congruent, depressed, blunted, anxious   Language: intact and appropriate for age, education, and intellect   Thought Process:  Linear and goal directed   Associations: intact associations   Thought Content:  negative thinking and cognitive distortions, obsessive   Perceptual Disturbances: no auditory or visual hallcunations   Risk Potential / Abnormal Thoughts: Suicidal ideation - None  Homicidal ideation - None  Potential for aggression - No       Consciousness:  Alert & Awake   Sensorium:  Grossly oriented   Attention: attention span and concentration are age appropriate       Fund of Knowledge:  Memory: awareness of current events: yes  past history: yes  vocabulary: yes  recent and remote memory grossly intact   Insight:  fair   Judgment: good   Muscle Strength Muscle Tone: normal  normal   Gait/Station: normal gait/station with good balance   Motor Activity: no abnormal movements       Risks, Benefits And Possible Side Effects Of Medications:    AGREE: Risks, benefits, and possible side effects of medications explained to Steve Ballard and she (or legal representative) verbalizes understanding and agreement for treatment      Controlled Medication Discussion:     Not applicable  ______________________________________________________________      Recent labs:  Appointment on 07/20/2021   Component Date Value    Vit D, 25-Hydroxy 07/20/2021 31 8          Past Psychiatric History  Previous diagnoses include: Anxiety and depression  Prior outpatient psychiatric treatment: None  Prior therapy: 3 months, 3 years ago  Disliked it  Prior inpatient psychiatric treatment: Denies  Prior suicide attempts: Denies  Prior self harm: Denies  Prior violence or aggression: Denies     Previous psychotropic medication trials:      Antidepressants: Zoloft     Mood Stabilizers: None     Anxiolytics: None     Typical antipsychotics: None     Atypical antipsychotics: None     Hypnotics/sleep aids: None     Social History:     Childhood was described as "good, mom made up for dad"      During childhood, mom supported patient while father abandoned her and participated in drug activity  They have 0 sister(s) and 2 half brother(s)  Patient is 3 in birth order     Abuse/neglect: none     As far as the patient (or present family member) is aware, overall childhood development: Patient does ascribe to normal developmental including childhood as well as regular educational course overall (eg no IEP, special education)      Education level: Senior year of college at [employer]   Current occupation: Student and part-time CNA  Marital status: Single, Boyfriend  Children: None  Current Living Situation: the patient currently lives with mother in [place], [place]   Social support: Mom, few friends     [employer]ar[employer] Affiliation: None   experience: Denies  Legal history: Denies  Access to Guns: Denies     Substance use and treatment:  Tobacco use: Denies but does use vape for past 5 years  Quit 1 month ago  Caffeine Use: 2 cups/day  ETOH use: occasional   Other substance use: Denies      Endorses previous experimentation with: MJ in high school     Longest clean time: 3 months clean from vaping  History of Inpatient/Outpatient rehabilitation program: no        Traumatic History:      Abuse: none  Other Traumatic Events: none      Family Psychiatric History:      Psychiatric Illness:      Father - depression  Substance Abuse: All grandparents alcohol use disorder  Suicide Attempts:        no family history of suicide attempts    Family Psychiatric History:   Family History   Problem Relation Age of Onset   Komal Adair Depression Father          Medical / Surgical History:    Past Medical History:   Diagnosis Date    Allergy     last assessed: 12/4/2013    Anxiety     Depression     Dysuria     last assessed: 3/5/2014    Kyphosis, acquired     last assessed: 1/17/2018    Murmur     last assessed: 6/24/2014     No past surgical history on file  Assessment/Plan:        Diagnoses and all orders for this visit:    Major depressive disorder, recurrent, mild (HCC)  -     venlafaxine (EFFEXOR-XR) 75 mg 24 hr capsule; Take 2 capsules (150 mg total) by mouth daily    Obsessive-compulsive disorder, unspecified type    Depression, recurrent ([address] )        ______________________________________________________________________    Additional Assessment:    Patient does seem to be showing very mild improvement on Effexor but is still on a very low dose that does not seem to be addressing her OCD symptoms  Continues to have moments of skin picking and hair picking but this has not resulted in overt excoriations  Patient has been able to maintain a job working as a CNA and does have good future motivation with goal orientation  Reports that she is starting school and is concerned about her ability to concentrate  Reports that concentration has been good enough recently to maintain appropriate lifestyle  Patient likely does not need a stimulant at this point but will continue to monitor for emergence functional deficits when starting school    Patient has continued with smoking sensation which likely may have led to some of her noticed improvement  Sleep seems to be in excellent condition as she is now using progressive relaxation techniques prior to going to sleep  Did not report any death anxiety or unstable relationship with brothers or father during this visit  Patient did fill out adult ADHD form which will be added to media  Will increase patient's dose of Effexor at this time and monitor for improvement  Future possibilities if symptoms remain refractory include addition of Strattera verses stimulant following optimization of Effexor  Will get patient on wait list to receive psychotherapy given multiple life stressors as she will likely benefit from this  Scales:  PHQ-9 was 12, now 11    BERTA-7 was conducted for the first time during this visit and result with a 13         Treatment Plan:        Patient has been educated about their diagnosis and treatment modalities  They voiced understanding and agreement with the following plan:    Discussed medications and if treatment adjustment was needed/desired      1) MEDS:           - Increase Effexor 150 mg PO QD for depression and OCD symptoms     2) Labs:   - 7/20/21 Vitamin D level: 31 8 which is low end of normal and patient informed to increase in diet and get outside regularly for sunlight     3) Therapy:    - Patient accepts therapy at this time and would like to be put on wait list to see a female therapist     4) Medical:    - Pt will f/u with other providers as needed     5) Other: Support as needed   - Patient provided adult ADHD pamphlet indicating moderate attention difficulties, mostly of inattentive time   - Continue to support mindfulness and relaxation techniques at bed time   - Recommended breaking packing for school up into small phases over the next few days to avoid high anxiety levels     6) Follow up:   - Follow up in 1 month    - Patient will call if issues or concerns      7) Treatment Plan:    - Enacted 7/12/21 Discussed self monitoring of symptoms, and symptom monitoring tools  Patient has been informed of 24 hours and weekend coverage for urgent situations accessed by calling the main clinic phone number               Psychotherapy in session:  Time spent performing psychotherapy: 16 Minutes

## 2021-08-20 ENCOUNTER — TELEPHONE (OUTPATIENT)
Dept: PSYCHIATRY | Facility: CLINIC | Age: 21
End: 2021-08-20

## 2021-09-14 ENCOUNTER — TELEPHONE (OUTPATIENT)
Dept: PSYCHIATRY | Facility: CLINIC | Age: 21
End: 2021-09-14

## 2021-09-14 ENCOUNTER — TELEMEDICINE (OUTPATIENT)
Dept: PSYCHIATRY | Facility: CLINIC | Age: 21
End: 2021-09-14
Payer: COMMERCIAL

## 2021-09-14 DIAGNOSIS — F17.200 NICOTINE USE DISORDER: ICD-10-CM

## 2021-09-14 DIAGNOSIS — E55.9 VITAMIN D DEFICIENCY: ICD-10-CM

## 2021-09-14 DIAGNOSIS — F33.0 MAJOR DEPRESSIVE DISORDER, RECURRENT, MILD (HCC): Primary | ICD-10-CM

## 2021-09-14 DIAGNOSIS — F42.9 OBSESSIVE-COMPULSIVE DISORDER, UNSPECIFIED TYPE: ICD-10-CM

## 2021-09-14 PROCEDURE — 3725F SCREEN DEPRESSION PERFORMED: CPT | Performed by: PSYCHIATRY & NEUROLOGY

## 2021-09-14 PROCEDURE — 1036F TOBACCO NON-USER: CPT | Performed by: PSYCHIATRY & NEUROLOGY

## 2021-09-14 PROCEDURE — 99214 OFFICE O/P EST MOD 30 MIN: CPT | Performed by: PSYCHIATRY & NEUROLOGY

## 2021-09-14 RX ORDER — DAPSONE 75 MG/G
GEL TOPICAL
COMMUNITY
Start: 2021-06-08 | End: 2022-06-10

## 2021-09-14 RX ORDER — VENLAFAXINE HYDROCHLORIDE 75 MG/1
225 CAPSULE, EXTENDED RELEASE ORAL DAILY
Qty: 90 CAPSULE | Refills: 1 | Status: SHIPPED | OUTPATIENT
Start: 2021-09-14 | End: 2021-10-18 | Stop reason: SDUPTHER

## 2021-09-14 NOTE — PSYCH
REQUIRED DOCUMENTATION:     1  This service was provided via Telemedicine  2  Provider located at [facility]  3  TeleMed provider: DO Sreekanth Locke  Identify all parties in room with patient during tele consult:  DO Marco Antonio Locke  Patient was then informed that this was a Telemedicine visit and that the exam was being conducted confidentially over secure lines  My office door was closed  No one else was in the room  Patient acknowledged consent and understanding of privacy and security of the Telemedicine visit, and gave us permission to have the assistant stay in the room in order to assist with the history and to conduct the exam   I informed the patient that I have reviewed their record in Epic and presented the opportunity for them to ask any questions regarding the visit today  The patient agreed to participate  MEDICATION MANAGEMENT NOTE          [facility]      Name and Date of Birth:  Silviano Fuller 24 y o  2000    Date of Visit: September 14, 2021    SUBJECTIVE:  CC: Gomez Ahumada presents today for follow up with her Major depressive disorder, recurrent, mild, OCD, and nicotine use disorder  Patient reports that her depression and anxiety have been under much better control recently with increased use Effexor  Indicates that she still has some moments of sadness but overall is feeling better  States that she has been socializing with peers and much more motivated due to starting school  Reports that she is living in a private apartment and appreciate her own personal space but has also been going out meeting new friends including people that she met online and classmates  Denies any side effects to medications  States that she is going to the gym and has a good diet which is resulted in her losing 15 lb over the last year which patient states she is excited about    Does report that she has been finger picking due to anxiety as school was a trigger for some of her OCD symptoms but states that her classes this semester overall are   "pretty easy  "states that she originally had some decreased sleep with Effexor but this went away in approximately 1-2 nights of sleep  Indicates that she is taking her vitamin  D supplement regularly  States that she has been utilizing mindfulness and progressive relaxation techniques at night to assist with sleep but has recently been able to sleep without the assistance of these exercises  States that she is sleeping 9 hours per night  States that she started doing yoga which is helped her stress level as well  Denies any SI/HI /AVH  Brittni Painting denies any side effects from medications unless noted above    Since our last visit, overall symptoms have been gradually improving  HPI ROS:     PHQ-9 Follow-up           PHQ-9 Score (since 8/14/2021)     PHQ-9 Score  11        BERTA-7 Flowsheet Screening      Most Recent Value   Over the last 2 weeks, how often have you been bothered by any of the following problems? Feeling nervous, anxious, or on edge  2   Not being able to stop or control worrying  2   Worrying too much about different things  2   Trouble relaxing  0   Being so restless that it is hard to sit still  1   Becoming easily annoyed or irritable  1   Feeling afraid as if something awful might happen  0   BERTA-7 Total Score  8            HPI ROS:             ('was' notes: recent => remote)  Medication Side Effects:  Denies  Denied   Depression (10 worst):  5 (Was 8)   Anxiety (10 worst): 5 (Was 7)   Safety concerns (SI, HI, etc): Denies SI/HI/AVH (Was Denied SI/HI/AVH)   Sleep: (NM = Nightmares) Can now get 9 hours without assitance (Was 9 hours with guided meditation and more rested)   Energy: WNL (Was Fluctuating)   Appetite: WNL (Was WNL)   Weight Change: 15 lbs down since last year via [place] as noted above   In addition:     Constitutional negative   ENT negative Cardiovascular negative   Respiratory negative   Gastrointestinal negative   Genitourinary negative   Musculoskeletal negative   Integumentary negative   Neurological negative   Endocrine negative   Other Symptoms none     Pain none   Pain Scale 0     History Review: The following portions of the patient's history were reviewed and updated as appropriate: allergies, current medications, past family history, past medical history, past social history, past surgical history and problem list      Lab Review: No new labs or no relevant labs needing review with patient today      OBJECTIVE:      MENTAL STATUS EXAM  Appearance:  age appropriate, dressed casually, overweight   Behavior:  pleasant, cooperative, with good eye contact   Speech:  soft, monotone   Mood:  "better"   Affect:  mood congruent, brighter with some improvement, constricted, mild anxiety   Language: intact and appropriate for age, education, and intellect   Thought Process:  Linear and goal directed   Associations: intact associations   Thought Content:  normal and appropriate   Perceptual Disturbances: no auditory or visual hallcunations   Risk Potential / Abnormal Thoughts: Suicidal ideation - None  Homicidal ideation - None  Potential for aggression - No       Consciousness:  Alert & Awake   Sensorium:  Grossly oriented   Attention: attention span and concentration are age appropriate       Fund of Knowledge:  Memory: awareness of current events: yes  past history: yes  vocabulary: yes  recent and remote memory grossly intact   Insight:  good   Judgment: good   Muscle Strength Muscle Tone: normal  normal   Gait/Station: normal gait/station with good balance   Motor Activity: no abnormal movements       Risks, Benefits And Possible Side Effects Of Medications:    AGREE: Risks, benefits, and possible side effects of medications explained to Denita Griffin and she (or legal representative) verbalizes understanding and agreement for treatment      Controlled Medication Discussion:     Not applicable  ______________________________________________________________      Recent labs:  No visits with results within 1 Month(s) from this visit  Latest known visit with results is:   Appointment on 07/20/2021   Component Date Value    Vit D, 25-Hydroxy 07/20/2021 31 8          Past Psychiatric History  Previous diagnoses include: Anxiety and depression  Prior outpatient psychiatric treatment: None  Prior therapy: 3 months, 3 years ago  Disliked it  Prior inpatient psychiatric treatment: Denies  Prior suicide attempts: Denies  Prior self harm: Denies  Prior violence or aggression: Denies     Previous psychotropic medication trials:      Antidepressants: Zoloft     Mood Stabilizers: None     Anxiolytics: None     Typical antipsychotics: None     Atypical antipsychotics: None     Hypnotics/sleep aids: None     Social History:     Childhood was described as "good, mom made up for dad"      During childhood, mom supported patient while father abandoned her and participated in drug activity  They have 0 sister(s) and 2 half brother(s)  Patient is 3 in birth order     Abuse/neglect: none     As far as the patient (or present family member) is aware, overall childhood development: Patient does ascribe to normal developmental including childhood as well as regular educational course overall (eg no IEP, special education)      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  Marital status: Single, Boyfriend  Children: None  Current Living Situation: the patient currently lives with mother in [place], [place]   Social support: Mom, few friends     Mandaeism Affiliation: None   experience: Denies  Legal history: Denies  Access to Guns: Denies     Substance use and treatment:  Tobacco use: Denies but does use vape for past 5 years  Quit 1 month ago    Caffeine Use: 2 cups/day  ETOH use: occasional   Other substance use: Denies     Endorses previous experimentation with: MJ in high school     Longest clean time: 3 months clean from vaping  History of Inpatient/Outpatient rehabilitation program: no        Traumatic History:      Abuse: none  Other Traumatic Events: none      Family Psychiatric History:      Psychiatric Illness:      Father - depression  Substance Abuse:       All grandparents alcohol use disorder  Suicide Attempts:        no family history of suicide attempts    Family Psychiatric History:   Family History   Problem Relation Age of Onset    Depression Father          Medical / Surgical History:    Past Medical History:   Diagnosis Date    Allergy     last assessed: 12/4/2013    Anxiety     Depression     Dysuria     last assessed: 3/5/2014    Kyphosis, acquired     last assessed: 1/17/2018    Murmur     last assessed: 6/24/2014     No past surgical history on file  Assessment/Plan:        Diagnoses and all orders for this visit:    Major depressive disorder, recurrent, mild (HCC)    Obsessive-compulsive disorder, unspecified type    Nicotine use disorder    Vitamin D deficiency    ______________________________________________________________________    Additional Assessment:   patient appears to be doing much better in terms of control with her depression and anxiety  Continues to endorse OCD symptoms of picking her fingers a lot which she states is triggered by school stress  Lifestyle improvements are significant as patient has began yoga and utilizes mindfulness and progressive relaxation techniques at night to assist in sleep which is within adequate amount at 9:00 a m  per night  States that she has not needed the relaxation applications in the last few nights and is now able to sleep without assistance  Subjective and objective measurements of anxiety and depression are both decreased    Will increase patient's dose of Effexor to help with residual depression and anxiety and to assist in OCD symptoms which require high doses of serotonin  Patient additionally has been making friends on campus from her class and exercising regularly  Has been taking vitamin-C supplementation for deficiency  Denies any current side effects to her medications  Continued to encourage proper sleep hygiene and maintenance of diet and exercise programs  Scales:  PHQ-9 was 11, now 6    BERTA-7 was 13, now 8         Treatment Plan:        Patient has been educated about their diagnosis and treatment modalities  They voiced understanding and agreement with the following plan:    Discussed medications and if treatment adjustment was needed/desired  1) MEDS:           - Increase Effexor to 225 mg PO QD for mood, anxiety, and OCD symptoms  PARQ completed including serotonin syndrome, SIADH, worsened depression/suicidality, induction of carmelita, blood pressure changes and GI distress, weight gain, sexual side effects, insomnia, sedation, potential for drug interactions, and others     2) Labs:   - 7/20/21 Vitamin D level: 31 8 which is low end of normal and patient informed to increase in diet and get outside regularly for sunlight     3) Therapy:    - Patient encouraged to call clinic today as she reports she got to the end of the wait list and is awaiting an intake appointment     4) Medical:    - Pt will f/u with other providers as needed     5) Other: Support as needed   - Continue to support mindfulness and relaxation techniques at bed time as needed   - Recommended continue diet and exercise regimens     6) Follow up:   - Follow up in 1 month    - Patient will call if issues or concerns      7) Treatment Plan:    - Enacted 7/12/21     Discussed self monitoring of symptoms, and symptom monitoring tools  Patient has been informed of 24 hours and weekend coverage for urgent situations accessed by calling the main clinic phone number               Psychotherapy in session:  Time spent performing psychotherapy: 16 Minutes

## 2021-09-16 ENCOUNTER — TELEPHONE (OUTPATIENT)
Dept: PSYCHIATRY | Facility: CLINIC | Age: 21
End: 2021-09-16

## 2021-09-16 NOTE — TELEPHONE ENCOUNTER
Patient called and asked what she should do she received a phone call to schedule appt with Therapy  Was on the waiting list she called and left message does not receive a phone call back  Can someone please reach out to her

## 2021-09-20 NOTE — TELEPHONE ENCOUNTER
Pt called about the wait list  The appt she had been called for has been taken   She has been placed back on the wait list

## 2021-10-18 ENCOUNTER — TELEMEDICINE (OUTPATIENT)
Dept: PSYCHIATRY | Facility: CLINIC | Age: 21
End: 2021-10-18

## 2021-10-18 DIAGNOSIS — F33.41 RECURRENT MAJOR DEPRESSIVE DISORDER, IN PARTIAL REMISSION (HCC): Primary | ICD-10-CM

## 2021-10-18 DIAGNOSIS — F41.1 GENERALIZED ANXIETY DISORDER: ICD-10-CM

## 2021-10-18 RX ORDER — VENLAFAXINE HYDROCHLORIDE 75 MG/1
225 CAPSULE, EXTENDED RELEASE ORAL DAILY
Qty: 90 CAPSULE | Refills: 2 | Status: SHIPPED | OUTPATIENT
Start: 2021-10-18 | End: 2021-11-16 | Stop reason: SDUPTHER

## 2021-10-19 ENCOUNTER — TELEPHONE (OUTPATIENT)
Dept: PSYCHIATRY | Facility: CLINIC | Age: 21
End: 2021-10-19

## 2021-11-16 DIAGNOSIS — F33.41 RECURRENT MAJOR DEPRESSIVE DISORDER, IN PARTIAL REMISSION (HCC): ICD-10-CM

## 2021-11-16 RX ORDER — VENLAFAXINE HYDROCHLORIDE 75 MG/1
225 CAPSULE, EXTENDED RELEASE ORAL DAILY
Qty: 90 CAPSULE | Refills: 2 | Status: SHIPPED | OUTPATIENT
Start: 2021-11-16 | End: 2022-02-10 | Stop reason: SDUPTHER

## 2021-12-21 ENCOUNTER — APPOINTMENT (OUTPATIENT)
Dept: URGENT CARE | Age: 21
End: 2021-12-21
Payer: OTHER MISCELLANEOUS

## 2021-12-21 PROCEDURE — 99283 EMERGENCY DEPT VISIT LOW MDM: CPT | Performed by: PHYSICIAN ASSISTANT

## 2021-12-21 PROCEDURE — G0382 LEV 3 HOSP TYPE B ED VISIT: HCPCS | Performed by: PHYSICIAN ASSISTANT

## 2021-12-22 ENCOUNTER — APPOINTMENT (OUTPATIENT)
Dept: URGENT CARE | Age: 21
End: 2021-12-22
Payer: OTHER MISCELLANEOUS

## 2021-12-22 PROCEDURE — 99213 OFFICE O/P EST LOW 20 MIN: CPT | Performed by: PREVENTIVE MEDICINE

## 2021-12-29 ENCOUNTER — TELEPHONE (OUTPATIENT)
Dept: PSYCHIATRY | Facility: CLINIC | Age: 21
End: 2021-12-29

## 2021-12-29 ENCOUNTER — APPOINTMENT (OUTPATIENT)
Dept: URGENT CARE | Age: 21
End: 2021-12-29
Payer: OTHER MISCELLANEOUS

## 2021-12-29 PROCEDURE — 99213 OFFICE O/P EST LOW 20 MIN: CPT | Performed by: PREVENTIVE MEDICINE

## 2022-01-18 ENCOUNTER — TELEPHONE (OUTPATIENT)
Dept: PSYCHIATRY | Facility: CLINIC | Age: 22
End: 2022-01-18

## 2022-01-19 ENCOUNTER — APPOINTMENT (OUTPATIENT)
Dept: URGENT CARE | Age: 22
End: 2022-01-19
Payer: OTHER MISCELLANEOUS

## 2022-01-19 PROCEDURE — 99213 OFFICE O/P EST LOW 20 MIN: CPT | Performed by: STUDENT IN AN ORGANIZED HEALTH CARE EDUCATION/TRAINING PROGRAM

## 2022-02-10 DIAGNOSIS — F33.41 RECURRENT MAJOR DEPRESSIVE DISORDER, IN PARTIAL REMISSION (HCC): ICD-10-CM

## 2022-02-10 RX ORDER — VENLAFAXINE HYDROCHLORIDE 75 MG/1
225 CAPSULE, EXTENDED RELEASE ORAL DAILY
Qty: 90 CAPSULE | Refills: 2 | Status: SHIPPED | OUTPATIENT
Start: 2022-02-10 | End: 2022-02-21 | Stop reason: SDUPTHER

## 2022-02-21 ENCOUNTER — TELEMEDICINE (OUTPATIENT)
Dept: PSYCHIATRY | Facility: CLINIC | Age: 22
End: 2022-02-21

## 2022-02-21 DIAGNOSIS — F42.2 MIXED OBSESSIONAL THOUGHTS AND ACTS: ICD-10-CM

## 2022-02-21 DIAGNOSIS — F33.42 MAJOR DEPRESSIVE DISORDER, RECURRENT, IN FULL REMISSION (HCC): Primary | ICD-10-CM

## 2022-02-21 DIAGNOSIS — F41.1 GENERALIZED ANXIETY DISORDER: ICD-10-CM

## 2022-02-21 RX ORDER — VENLAFAXINE HYDROCHLORIDE 75 MG/1
225 CAPSULE, EXTENDED RELEASE ORAL DAILY
Qty: 90 CAPSULE | Refills: 2 | Status: SHIPPED | OUTPATIENT
Start: 2022-02-21 | End: 2022-06-10 | Stop reason: SDUPTHER

## 2022-02-21 NOTE — BH TREATMENT PLAN
Name and Date of Birth:  Shanta Garcia 24 y o  2000    Date of Treatment Plan: February 21, 2022    Diagnosis/Diagnoses:     1  Major depressive disorder, recurrent, in full remission ([facility] [address] )    2  Mixed obsessional thoughts and acts    3  Generalized anxiety disorder      Strengths/Personal Resources for Self-Care: ability to adapt to life changes, ability to communicate well, ability to listen, ability to reason, average or above intelligence, financial means, general fund of knowledge, good physical health, good understanding of illness, independence, ability to negotiate basic needs, self-reliance, well educated, willingness to work on problems, work skills      Area/Areas of need (in own words): depressive symptoms, mood swings, obsessive-compulsive symptoms  1  Long Term Goal: maintain control of anxiety, maintain control of depression, maintain control of mood stability, maintain improvement in obsessive thoughts  Target Date: 6 months - 8/21/2022  Person/Persons responsible for completion of goal: Magali Olszewski    2  Short Term Objective (s) - How will we reach this goal?:     A  Provider new recommended medication/dosage changes and/or continue medication(s): continue current medications as prescribed  B   Attend psychotherapy regularly  C   Attend medication management appointments regularly  Target Date: 6 months - 8/21/2022  Person/Persons Responsible for Completion of Goal: Magali Olszewski     Progress Towards Goals: continuing treatment    Treatment Modality: medication management every 3 months, continue psychotherapy with own therapist    Review due 90 to 120 days from date of this plan: 4 months - 6/21/2022  Expected length of service: maintenance unless revised    My Physician/PA/NP and I have developed this plan together and I agree to work on the goals and objectives  I understand the treatment goals that were developed for my treatment      Signature: Verbal consent obtained due to virtual visit and COVID-19  Date and time:2/21/22  Signature of parent/guardian if under age of 15 years: Date and time:  Signature of provider:      Date and time:  Signature of Supervising Physician:    Date and time: 2/21/2022      Johnny Vee, DO

## 2022-02-21 NOTE — PSYCH
Virtual Brief Visit    Patient is located in the following state in which I hold an active license [place]      Encounter provider Dotty Marin DO    Provider located at [address] [address] [address]  [place] [address] [address] [place] [id number] [phone]    The patient was identified by name and date of birth  Atif Kimble was informed that this is a telemedicine visit and that the visit is being conducted through [facility] and patient was informed this is a secure, HIPAA-complaint platform  She agrees to proceed     My office door was closed  No one else was in the room  She acknowledged consent and understanding of privacy and security of the video platform  The patient has agreed to participate and understands they can discontinue the visit at any time  Patient is aware this is a billable service  MEDICATION MANAGEMENT NOTE        [facility]      Name and Date of Birth:  Atif Kimble 24 y o  2000    Date of Visit: February 21, 2022    SUBJECTIVE:  CC: Sasha Quinn presents today for follow up with MDD, recurrent, in partial remission and BERTA  Indicates that she is at school in [employer] currently and having Spring break soon  Depression and anxiety are well controlled other than normal normal stress with school work  Indicates her grades are good and is set to graduate in May or maybe after if she needs a few extra classes  Considering a different career path  Currently on PA track and considering ecology instead  Sleep is WNL and reports getting 9 hours per night and is rested in morning  Appetite has been WNL and reports she became Vegan last year which has helped her mood and energy  Physical activity is consistent at 3 times per week and she is constantly walking to class and outside  States she is seeing a therapist and is working on developing coping skills and distress tolerance   Reports that she does have some obsessive thoughts and could stop thinking about this originally  Reports some routines with locking doors and dressing rituals/showing rituals  States she has to repeat dressing if she isn't happy with what she is wearing  States this occurs 1-3 times per week  Indicates that she spends about 7 or more hours per week on these things currently  Jonah Duong denies any side effects from medications unless noted above    Since our last visit, overall symptoms have been stable  HPI ROS:     PHQ-9 Depression Screening    Little interest or pleasure in doing things: 0 - not at all  Feeling down, depressed, or hopeless: 0 - not at all  Trouble falling or staying asleep, or sleeping too much: 1 - several days  Feeling tired or having little energy: 1 - several days  Poor appetite or overeatin - not at all  Feeling bad about yourself - or that you are a failure or have let yourself or your family down: 0 - not at all  Trouble concentrating on things, such as reading the newspaper or watching television: 0 - not at all  Moving or speaking so slowly that other people could have noticed   Or the opposite - being so fidgety or restless that you have been moving around a lot more than usual: 0 - not at all  Thoughts that you would be better off dead, or of hurting yourself in some way: 0 - not at all  PHQ-9 Score: 2  Score Interpretation: No or Minimal depression         PHQ-2/9 Depression Screening    Little interest or pleasure in doing things: 0 - not at all  Feeling down, depressed, or hopeless: 0 - not at all  Trouble falling or staying asleep, or sleeping too much: 1 - several days  Feeling tired or having little energy: 1 - several days  Poor appetite or overeatin - not at all  Feeling bad about yourself - or that you are a failure or have let yourself or your family down: 0 - not at all  Trouble concentrating on things, such as reading the newspaper or watching television: 0 - not at all  Moving or speaking so slowly that other people could have noticed  Or the opposite - being so fidgety or restless that you have been moving around a lot more than usual: 0 - not at all  Thoughts that you would be better off dead, or of hurting yourself in some way: 0 - not at all  PHQ-9 Score: 2   PHQ-9 Interpretation: No or Minimal depression            BERTA-7 Flowsheet Screening      Most Recent Value   Over the last 2 weeks, how often have you been bothered by any of the following problems? Feeling nervous, anxious, or on edge 1   Not being able to stop or control worrying 1   Worrying too much about different things 2   Trouble relaxing 0   Being so restless that it is hard to sit still 0   Becoming easily annoyed or irritable 0   Feeling afraid as if something awful might happen 0   BERTA-7 Total Score 4        HPI ROS:             ('was' notes: recent => remote)  Medication Side Effects:  Denies  Denied   Depression (10 worst): 2/10 (Was 3/10)   Anxiety (10 worst): 4/10 (Was 2/10)   Safety concerns (SI, HI, etc): Denied SI/HI/AVH (Was Denied SI/HI/AVH)   Sleep: (NM = Nightmares) WNL at 9 hours per night (Was Sleeping 7 hours but not tired)   Energy: WNL (Was WNL)   Appetite: WNL (Was WNL)   Weight Change: Some loss but not too much change Stable     Review Of Systems as noted above  In addition:     Constitutional negative   ENT negative   Cardiovascular negative   Respiratory negative   Gastrointestinal negative   Genitourinary negative   Musculoskeletal negative   Integumentary negative   Neurological negative   Endocrine negative   Other Symptoms none     Pain none   Pain Scale 0     History Review:  The following portions of the patient's history were reviewed and updated as appropriate: allergies, current medications, past family history, past medical history, past social history, past surgical history and problem list      Lab Review: No new labs or no relevant labs needing review with patient today      OBJECTIVE:     MENTAL STATUS EXAM  Appearance:  age appropriate, dressed casually, overweight   Behavior:  pleasant, cooperative, with good eye contact   Speech:  Regular rate and rhythm, soft   Mood:  "good "   Affect:  mood congruent, euthymic   Language: intact and appropriate for age, education, and intellect   Thought Process:  Linear and goal directed   Associations: intact associations   Thought Content:  negative thinking and cognitive distortions, intrusive, obsessive thoughts   Perceptual Disturbances: no auditory or visual hallcunations   Risk Potential / Abnormal Thoughts: Suicidal ideation - None  Homicidal ideation - None  Potential for aggression - No       Consciousness:  Alert & Awake   Sensorium:  Grossly oriented   Attention: attention span and concentration are age appropriate       Fund of Knowledge:  Memory: awareness of current events: yes  past history: yes  vocabulary: yes  recent and remote memory grossly intact   Insight:  good   Judgment: good   Muscle Strength Muscle Tone: normal  normal   Gait/Station: normal gait/station with good balance   Motor Activity: no abnormal movements       Risks, Benefits And Possible Side Effects Of Medications:    AGREE: Risks, benefits, and possible side effects of medications explained to TRUPTI and she (or legal representative) verbalizes understanding and agreement for treatment  Controlled Medication Discussion:     Not applicable  ______________________________________________________________      Recent labs:  No visits with results within 1 Month(s) from this visit  Latest known visit with results is:   Appointment on 07/20/2021   Component Date Value    Vit D, 25-Hydroxy 07/20/2021 31 8          Past Psychiatric History  Previous diagnoses include: Anxiety and depression  Prior outpatient psychiatric treatment: None  Prior therapy: 3 months, 3 years ago   Disliked it  Prior inpatient psychiatric treatment: Denies  Prior suicide attempts: Denies  Prior self harm: Denies  Prior violence or aggression: Denies     Previous psychotropic medication trials:      Antidepressants: Zoloft     Mood Stabilizers: None     Anxiolytics: None     Typical antipsychotics: None     Atypical antipsychotics: None     Hypnotics/sleep aids: None     Social History:     Childhood was described as "good, mom made up for dad"      During childhood, mom supported patient while father abandoned her and participated in drug activity  They have 0 sister(s) and 2 half brother(s)  Patient is 3 in birth order     Abuse/neglect: none     As far as the patient (or present family member) is aware, overall childhood development: Patient does ascribe to normal developmental including childhood as well as regular educational course overall (eg no IEP, special education)      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  Marital status: Single, Boyfriend  Children: None  Current Living Situation: the patient currently lives with mother in [place], [place]   Social support: Mom, few friends     Religion Affiliation: None   experience: Denies  Legal history: Denies  Access to Guns: Denies     Substance use and treatment:  Tobacco use: Denies but does use vape for past 5 years  Quit 1 month ago    Caffeine Use: 2 cups/day  ETOH use: occasional   Other substance use: Denies     Endorses previous experimentation with: MJ in high school     Longest clean time: 3 months clean from vaping  History of Inpatient/Outpatient rehabilitation program: no        Traumatic History:      Abuse: none  Other Traumatic Events: none      Family Psychiatric History:      Psychiatric Illness:      Father - depression  Substance Abuse:       All grandparents alcohol use disorder  Suicide Attempts:        no family history of suicide attempts    Family Psychiatric History:   Family History   Problem Relation Age of Onset    Depression Father          Medical / Surgical History:    Past Medical History:   Diagnosis Date    Allergy     last assessed: 12/4/2013    Anxiety     Depression     Dysuria     last assessed: 3/5/2014    Kyphosis, acquired     last assessed: 1/17/2018    Murmur     last assessed: 6/24/2014     History reviewed  No pertinent surgical history  Assessment/Plan:        Diagnoses and all orders for this visit:    Major depressive disorder, recurrent, in full remission (HCC)  -     venlafaxine (EFFEXOR-XR) 75 mg 24 hr capsule; Take 3 capsules (225 mg total) by mouth daily    Mixed obsessional thoughts and acts    Generalized anxiety disorder        ______________________________________________________________________    Additional Assessment:  Patient reports good control of depression and anxiety symptoms based on objective and subjective measurements  Does report having obsessive/intrusive thoughts in regards to what she wears and several other things throughout her day resulting in ritualized behavior with putting clothes on and off as well as checking behaviors which consume over 7 hours of her time during the week  Reports that these have not been very disruptive to her life but does wonder whether there impacting her ability to perform on tests  Patient is not able to report any intrusive thoughts while in the testing environment but does report that she 2nd guesses herself on tests a lot  Symptoms seem to be more a result of test taking skills in strategy deficit rather than uncontrolled symptoms of OCD  Did offer augmentation with BuSpar to assist with daytime anxiety and adjunctively to assist with function of her SNRI  Patient believes that she is currently on a stable regimen and declines further intervention  Indicates that she is considering switching majors  Did point out to patient that she is in a very difficult educational path and that struggling with high end material is not unexpected    Does report that she has been maintaining decent grades  No medication changes will be made for patient at this time  Does report that she became vegan recently and notices an improvement in her overall energy level and mood  Reports that she is getting consistent exercise and sleep  Patient has been engaging in psychotherapy which has been beneficial to her and encouraged her to discuss managing her test problems/anxiety with her therapist     Scales:  PHQ-9 was 4, now 2 wnl    BERTA-7 was 2, now 4 wnl          Treatment Plan:        Patient has been educated about their diagnosis and treatment modalities  They voiced understanding and agreement with the following plan:    Discussed medications and if treatment adjustment was needed/desired  1) MEDS:           - Continue Effexor 225 mg PO QD for mood, anxiety, and OCD symptoms  PARQ completed including serotonin syndrome, SIADH, worsened depression/suicidality, induction of carmelita, blood pressure changes and GI distress, weight gain, sexual side effects, insomnia, sedation, potential for drug interactions, and others    - Consider Buspar augmentation for day time anxiety     2) Labs:   - 7/25/21: Vitamin D level: 31 8 wnl  TSH 1 330 wnl  CBC unremarkable      3) Therapy:    - Continue with own therapist at school     4) Medical:    - Pt will f/u with other providers as needed     5) Other: Support as needed   - Continue to obtain quality sleep   - Recommended continue diet and exercise regimens and healthy eating habits     6) Follow up:   - Follow up in 3 month for medication management   - Patient will call if issues or concerns      7) Treatment Plan:   - Enacted 2/21/22     Discussed self monitoring of symptoms, and symptom monitoring tools  Patient has been informed of 24 hours and weekend coverage for urgent situations accessed by calling the main clinic phone number               Psychotherapy in session:  Time spent performing psychotherapy: 16 Minutes    Video Exam    There were no vitals filed for this visit  Physical Exam     I spent 45 minutes directly with the patient during this visit    VIRTUAL VISIT DISCLAIMER      Heladio Kerr verbally agrees to participate in [employer]  Pt is aware that [employer] could be limited without vital signs or the ability to perform a full hands-on physical exam  Heladio Kerr understands she or the provider may request at any time to terminate the video visit and request the patient to seek care or treatment in person

## 2022-02-22 ENCOUNTER — TELEPHONE (OUTPATIENT)
Dept: PSYCHIATRY | Facility: CLINIC | Age: 22
End: 2022-02-22

## 2022-06-10 ENCOUNTER — OFFICE VISIT (OUTPATIENT)
Dept: PSYCHIATRY | Facility: CLINIC | Age: 22
End: 2022-06-10

## 2022-06-10 VITALS — BODY MASS INDEX: 30.01 KG/M2 | WEIGHT: 198 LBS | SYSTOLIC BLOOD PRESSURE: 123 MMHG | DIASTOLIC BLOOD PRESSURE: 80 MMHG

## 2022-06-10 DIAGNOSIS — Z79.899 ENCOUNTER FOR MEDICATION MANAGEMENT: ICD-10-CM

## 2022-06-10 DIAGNOSIS — R46.81 OBSESSIVE-COMPULSIVE BEHAVIOR: ICD-10-CM

## 2022-06-10 DIAGNOSIS — F41.1 GENERALIZED ANXIETY DISORDER: ICD-10-CM

## 2022-06-10 DIAGNOSIS — F33.42 MAJOR DEPRESSIVE DISORDER, RECURRENT, IN FULL REMISSION (HCC): Primary | ICD-10-CM

## 2022-06-10 RX ORDER — CLINDAMYCIN PHOSPHATE 10 UG/ML
LOTION TOPICAL
COMMUNITY
Start: 2022-04-15

## 2022-06-10 RX ORDER — VENLAFAXINE HYDROCHLORIDE 75 MG/1
225 CAPSULE, EXTENDED RELEASE ORAL DAILY
Qty: 90 CAPSULE | Refills: 2 | Status: SHIPPED | OUTPATIENT
Start: 2022-06-10 | End: 2022-08-07

## 2022-06-10 NOTE — PSYCH
MEDICATION MANAGEMENT NOTE        [place] [address] [facility]      Name and Date of Birth:  Mandie Fraser 25 y o  2000    Date of Visit: Larisa 10, 2022    SUBJECTIVE:  CC: Cody Valencia presents today for follow up with MDD, recurrent, in full remission, BERTA, and mixed obsessional thoughts and acts  States that her mood is "good" and denies feeling depressed  Energy level is within normal limits and states she is really active at work physically with 20k steps per day  Motivation has been good and she is able to set her mind on goals  Anxiety has improved now that she has found a vocation she enjoys  Obessive thoughts are minimal but focused on her increasing anxiety if things aren't done a certain way  Compulsive behaviors such as checking and take clothes on and off as well as ritualized behaviors continue  States she graduated in May and got COVID right after  Indicates she has time to focus on other things aside from school now which has been helpful for her stress level  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 and is happier there than she ever was with medicine  Sleep is good for her and she is in a good routine with no reported issue  Appetite has been stable and normal for her  Vegan diet has been continued and she claims to have continued to benefit from this in terms of her mood  States she is unsure of how she gained 10 lbs in the past month  Indicates she just had her period a week ago and knows she is not pregnant  Psychotherapy has been beneficial for her and she enjoys the venue for venting purposes and brainstorming for objective improvments  Ardine Katayama to night sweats on Effexor    Since our last visit, overall symptoms have been gradually improving       HPI ROS:     PHQ-9 Depression Screening    Little interest or pleasure in doing things: 0 - not at all  Feeling down, depressed, or hopeless: 1 - several days  Trouble falling or staying asleep, or sleeping too much: 0 - not at all  Feeling tired or having little energy: 1 - several days  Poor appetite or overeatin - not at all  Feeling bad about yourself - or that you are a failure or have let yourself or your family down: 0 - not at all  Trouble concentrating on things, such as reading the newspaper or watching television: 0 - not at all  Moving or speaking so slowly that other people could have noticed  Or the opposite - being so fidgety or restless that you have been moving around a lot more than usual: 0 - not at all  Thoughts that you would be better off dead, or of hurting yourself in some way: 0 - not at all  PHQ-9 Score: 2  Score Interpretation: No or Minimal depression         PHQ-2/9 Depression Screening    Little interest or pleasure in doing things: 0 - not at all  Feeling down, depressed, or hopeless: 1 - several days  Trouble falling or staying asleep, or sleeping too much: 0 - not at all  Feeling tired or having little energy: 1 - several days  Poor appetite or overeatin - not at all  Feeling bad about yourself - or that you are a failure or have let yourself or your family down: 0 - not at all  Trouble concentrating on things, such as reading the newspaper or watching television: 0 - not at all  Moving or speaking so slowly that other people could have noticed   Or the opposite - being so fidgety or restless that you have been moving around a lot more than usual: 0 - not at all  Thoughts that you would be better off dead, or of hurting yourself in some way: 0 - not at all  PHQ-9 Score: 2   PHQ-9 Interpretation: No or Minimal depression          HPI ROS:             ('was' notes: recent => remote)  Medication Side Effects:  Night sweats on Effexor  Denied   Depression (10 worst): 2/10 (Was 2/10)   Anxiety (10 worst): 1/10 (Was 4/10)   Safety concerns (SI, HI, etc): Denied SI/HI/AVH (Was Denied SI/HI/AVH)   Sleep: (NM = Nightmares) WNL at 9 hours per night (Was WNL at 9 hours per night) Energy: WNL (Was WNL)   Appetite: WNL (Was WNL)   Weight Change: Gained 10 lbs in 3 months Some loss but not too much change     Review Of Systems as noted above  In addition:     Constitutional sweating and recent weight gain (10 lbs)   ENT negative   Cardiovascular negative   Respiratory negative   Gastrointestinal negative   Genitourinary negative   Musculoskeletal negative   Integumentary negative   Neurological negative   Endocrine negative   Other Symptoms none     Pain none   Pain Scale 0     History Review:  The following portions of the patient's history were reviewed and updated as appropriate: allergies, current medications, past family history, past medical history, past social history, past surgical history and problem list      Lab Review: No new labs or no relevant labs needing review with patient today      OBJECTIVE:     MENTAL STATUS EXAM  Appearance:  age appropriate, dressed casually, overweight   Behavior:  pleasant, cooperative, with good eye contact   Speech:  Regular rate and rhythm, soft   Mood:  "good "   Affect:  mood congruent, euthymic   Language: intact and appropriate for age, education, and intellect   Thought Process:  Linear and goal directed   Associations: intact associations   Thought Content:  negative thinking and cognitive distortions   Perceptual Disturbances: no auditory or visual hallcunations   Risk Potential / Abnormal Thoughts: Suicidal ideation - None  Homicidal ideation - None  Potential for aggression - No       Consciousness:  Alert & Awake   Sensorium:  Grossly oriented   Attention: attention span and concentration are improving       Fund of Knowledge:  Memory: awareness of current events: yes  past history: yes  vocabulary: yes  recent and remote memory grossly intact   Insight:  good   Judgment: good   Muscle Strength Muscle Tone: normal  normal   Gait/Station: normal gait/station with good balance   Motor Activity: no abnormal movements       Risks, Benefits And Possible Side Effects Of Medications:    AGREE: Risks, benefits, and possible side effects of medications explained to Christina Nunes and she (or legal representative) verbalizes understanding and agreement for treatment  Controlled Medication Discussion:     Not applicable  ______________________________________________________________      Recent labs:  No visits with results within 1 Month(s) from this visit  Latest known visit with results is:   Appointment on 07/20/2021   Component Date Value    Vit D, 25-Hydroxy 07/20/2021 31 8          Past Psychiatric History  Previous diagnoses include: Anxiety and depression  Prior outpatient psychiatric treatment: None  Prior therapy: 3 months, 3 years ago  Disliked it  Prior inpatient psychiatric treatment: Denies  Prior suicide attempts: Denies  Prior self harm: Denies  Prior violence or aggression: Denies     Previous psychotropic medication trials:      Antidepressants: Zoloft     Mood Stabilizers: None     Anxiolytics: None     Typical antipsychotics: None     Atypical antipsychotics: None     Hypnotics/sleep aids: None     Social History:     Childhood was described as "good, mom made up for dad"      During childhood, mom supported patient while father abandoned her and participated in drug activity  They have 0 sister(s) and 2 half brother(s)   Patient is 3 in birth order     Abuse/neglect: none     As far as the patient (or present family member) is aware, overall childhood development: Patient does ascribe to normal developmental including childhood as well as regular educational course overall (eg no IEP, special education)      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  Marital status: Single, Boyfriend  Children: None  Current Living Situation: the patient currently lives with mother in [facility], [address] support: Mom, few friends  [address] experience: Denies  Legal history: Denies  Access to Guns: Denies     Substance use and treatment:  Tobacco use: Denies but does use vape for past 5 years  Quit 1 month ago  Caffeine Use: 2 cups/day  ETOH use: occasional   Other substance use: Denies     Endorses previous experimentation with: MJ in high school     Longest clean time: 3 months clean from vaping  History of Inpatient/Outpatient rehabilitation program: no        Traumatic History:      Abuse: none  Other Traumatic Events: none      Family Psychiatric History:      Psychiatric Illness:      Father - depression  Substance Abuse:       All grandparents alcohol use disorder  Suicide Attempts:        no family history of suicide attempts    Family Psychiatric History:   Family History   Problem Relation Age of Onset    Depression Father          Medical / Surgical History:    Past Medical History:   Diagnosis Date    Allergy     last assessed: 12/4/2013    Anxiety     Depression     Dysuria     last assessed: 3/5/2014    Kyphosis, acquired     last assessed: 1/17/2018    Murmur     last assessed: 6/24/2014     History reviewed  No pertinent surgical history  Assessment/Plan:        Diagnoses and all orders for this visit:    Major depressive disorder, recurrent, in full remission (HCC)  -     venlafaxine (EFFEXOR-XR) 75 mg 24 hr capsule; Take 3 capsules (225 mg total) by mouth daily    Generalized anxiety disorder    Obsessive-compulsive behavior    Encounter for medication management  -     CBC and differential; Future  -     Comprehensive metabolic panel; Future  -     TSH, 3rd generation with Free T4 reflex; Future  -     HEMOGLOBIN A1C W/ EAG ESTIMATION; Future  -     Lipid Panel with Direct LDL reflex;  Future    Other orders  -     clindamycin (CLEOCIN T) 1 % lotion; apply thin layer to face twice a day        ______________________________________________________________________    Additional Assessment:  Patient continues to have well controlled mood and anxiety symptoms based on objective measurements and anxiety symptoms were not taken due to notable absence on objective measurements of anxiety  Continues to endorse very low mood symptoms  Reports that overall her psychiatric symptoms have improved since she finished school in May and 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  Reports that she was having difficulty with physician's assistant training and believes that this new vocation is much less stressful and enjoyable for her  Patient does endorse a 10 lb weight gain over the previous 3 months but denies any noticeable changes in her appetite or physical activity  Reports that if anything, her physical activity is increased since 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 as she gets approximally 20,000 steps per day  Reports that she has not seen her PCP in well over a year and has not had laboratories drawn for at least that period of time  Is additionally reporting nighttime sweats which began about 1 month ago and further questioning reveals that she started taking her Effexor prior to bed approximately 1 month ago which is likely contributing to her night sweats  Did provide psychoeducation about mechanism of action of this drug and how norepinephrine could increase autonomic input in the evening which is likely driving the sweating  Nevertheless will order battery of labs for full organic workup patient's psychiatric and physical concerns including TSH, CBC, CMP, hemoglobin A1c, and lipid panel  Encourage patient to follow-up with PCP/reestablish appointments  Encourage her to continue healthy lifestyle habits with physical activity and monitoring her diet  Instructed her to call this provider to move up appointment if she continues to have night sweats despite taking Effexor in the morning time to which he voiced understanding and agreement with    Will continue Effexor at current dose for help with mood and anxiety in addition to obsessive/compulsive behaviors which have continued but do seem to be less over the previous few months since finishing school and may be a component of her generalized anxiety disorder  Will change diagnosis from mixed obsessional thoughts and acts to obsessive-compulsive behaviors which seems like a more fitting diagnosis at this time  Scales:  PHQ-9 was 2, now 2  - Prior 4    BERTA-7 was 4, now was not obtained due to noted/endorsed stability  - Prior 2         Treatment Plan:        Patient has been educated about their diagnosis and treatment modalities  They voiced understanding and agreement with the following plan:    Discussed medications and if treatment adjustment was needed/desired  1) MEDS:           - Continue Effexor 225 mg PO QD for mood, anxiety, and OCD symptoms  PARQ completed including serotonin syndrome, SIADH, worsened depression/suicidality, induction of carmelita, blood pressure changes and GI distress, weight gain, sexual side effects, insomnia, sedation, potential for drug interactions, and others     2) Labs:   - 7/25/21: Vitamin D level: 31 8 wnl  TSH 1 330 wnl  CBC unremarkable  - Ordered: CBC, CMP, TSH, Lipid panel, and A1c     3) Therapy:    - Continue with own therapist at school and continues virtually     4) Medical:    - Pt will f/u with other providers as needed   - Recommended follow up with PCP     5) Other: Support as needed   - Continue to obtain quality sleep   - Recommended continue diet and exercise regimens and healthy eating habits     6) Follow up:   - Follow up in 3 month for medication management   - Patient will call if issues or concerns      7) Treatment Plan:   - Enacted 6/10/22     Discussed self monitoring of symptoms, and symptom monitoring tools  Patient has been informed of 24 hours and weekend coverage for urgent situations accessed by calling the main clinic phone number               Psychotherapy in session:  Time spent performing psychotherapy: 16 Minutes

## 2022-06-10 NOTE — BH TREATMENT PLAN
Name and Date of Birth:  Jm Bran 25 y o  2000    Date of Treatment Plan: Larisa 10, 2022    Diagnosis/Diagnoses:     1  Major depressive disorder, recurrent, in full remission ([facility] [address] )    2  Generalized anxiety disorder    3  Obsessive-compulsive behavior      Strengths/Personal Resources for Self-Care: ability to adapt to life changes, ability to communicate well, ability to listen, ability to reason, average or above intelligence, financial means, general fund of knowledge, good physical health, good understanding of illness, independence, ability to negotiate basic needs, self-reliance, well educated, willingness to work on problems, work skills      Area/Areas of need (in own words): depressive symptoms, obsessive-compulsive symptoms  1  Long Term Goal: maintain stability of depression, continue improvement in obsessive thoughts, continue improvement in compulsive behavior  Target Date: 6 months - 12/10/2022  Person/Persons responsible for completion of goal: Miranda Aggarwal    2  Short Term Objective (s) - How will we reach this goal?:     A  Provider new recommended medication/dosage changes and/or continue medication(s): continue current medications as prescribed, check CBC/diff, CMP, lipid profile, hemoglobin A1C and TSH in 3 months  B   Attend psychotherapy regularly  Rosa Harding and maintain PCP follow up  Target Date: 6 months - 12/10/2022  Person/Persons Responsible for Completion of Goal: Miranda Aggarwal     Progress Towards Goals: continuing treatment    Treatment Modality: medication management every 3 months, continue psychotherapy with own therapist    Review due 90 to 120 days from date of this plan: 4 months - 10/10/2022  Expected length of service: maintenance unless revised    My Physician/PA/NP and I have developed this plan together and I agree to work on the goals and objectives  I understand the treatment goals that were developed for my treatment      Signature:       Date and time:  Signature of parent/guardian if under age of 15 years: Date and time:  Signature of provider:      Date and time:  Signature of Supervising Physician:    Date and time: 6/10/2022      Summer Marquez DO

## 2022-06-17 ENCOUNTER — APPOINTMENT (OUTPATIENT)
Dept: LAB | Facility: CLINIC | Age: 22
End: 2022-06-17
Payer: COMMERCIAL

## 2022-06-17 DIAGNOSIS — Z79.899 ENCOUNTER FOR MEDICATION MANAGEMENT: ICD-10-CM

## 2022-06-17 LAB
ALBUMIN SERPL BCP-MCNC: 3.9 G/DL (ref 3.5–5)
ALP SERPL-CCNC: 53 U/L (ref 46–116)
ALT SERPL W P-5'-P-CCNC: 29 U/L (ref 12–78)
ANION GAP SERPL CALCULATED.3IONS-SCNC: 5 MMOL/L (ref 4–13)
AST SERPL W P-5'-P-CCNC: 17 U/L (ref 5–45)
BASOPHILS # BLD AUTO: 0.05 THOUSANDS/ΜL (ref 0–0.1)
BASOPHILS NFR BLD AUTO: 1 % (ref 0–1)
BILIRUB SERPL-MCNC: 0.45 MG/DL (ref 0.2–1)
BUN SERPL-MCNC: 7 MG/DL (ref 5–25)
CALCIUM SERPL-MCNC: 9 MG/DL (ref 8.3–10.1)
CHLORIDE SERPL-SCNC: 110 MMOL/L (ref 100–108)
CHOLEST SERPL-MCNC: 161 MG/DL
CO2 SERPL-SCNC: 24 MMOL/L (ref 21–32)
CREAT SERPL-MCNC: 0.8 MG/DL (ref 0.6–1.3)
EOSINOPHIL # BLD AUTO: 0.37 THOUSAND/ΜL (ref 0–0.61)
EOSINOPHIL NFR BLD AUTO: 7 % (ref 0–6)
ERYTHROCYTE [DISTWIDTH] IN BLOOD BY AUTOMATED COUNT: 12.7 % (ref 11.6–15.1)
EST. AVERAGE GLUCOSE BLD GHB EST-MCNC: 88 MG/DL
GFR SERPL CREATININE-BSD FRML MDRD: 104 ML/MIN/1.73SQ M
GLUCOSE P FAST SERPL-MCNC: 77 MG/DL (ref 65–99)
HBA1C MFR BLD: 4.7 %
HCT VFR BLD AUTO: 43.5 % (ref 34.8–46.1)
HDLC SERPL-MCNC: 64 MG/DL
HGB BLD-MCNC: 14.3 G/DL (ref 11.5–15.4)
IMM GRANULOCYTES # BLD AUTO: 0.01 THOUSAND/UL (ref 0–0.2)
IMM GRANULOCYTES NFR BLD AUTO: 0 % (ref 0–2)
LDLC SERPL CALC-MCNC: 84 MG/DL (ref 0–100)
LYMPHOCYTES # BLD AUTO: 1.62 THOUSANDS/ΜL (ref 0.6–4.47)
LYMPHOCYTES NFR BLD AUTO: 30 % (ref 14–44)
MCH RBC QN AUTO: 30.7 PG (ref 26.8–34.3)
MCHC RBC AUTO-ENTMCNC: 32.9 G/DL (ref 31.4–37.4)
MCV RBC AUTO: 93 FL (ref 82–98)
MONOCYTES # BLD AUTO: 0.46 THOUSAND/ΜL (ref 0.17–1.22)
MONOCYTES NFR BLD AUTO: 9 % (ref 4–12)
NEUTROPHILS # BLD AUTO: 2.84 THOUSANDS/ΜL (ref 1.85–7.62)
NEUTS SEG NFR BLD AUTO: 53 % (ref 43–75)
NRBC BLD AUTO-RTO: 0 /100 WBCS
PLATELET # BLD AUTO: 264 THOUSANDS/UL (ref 149–390)
PMV BLD AUTO: 10.4 FL (ref 8.9–12.7)
POTASSIUM SERPL-SCNC: 4.3 MMOL/L (ref 3.5–5.3)
PROT SERPL-MCNC: 7.4 G/DL (ref 6.4–8.2)
RBC # BLD AUTO: 4.66 MILLION/UL (ref 3.81–5.12)
SODIUM SERPL-SCNC: 139 MMOL/L (ref 136–145)
TRIGL SERPL-MCNC: 63 MG/DL
TSH SERPL DL<=0.05 MIU/L-ACNC: 1.67 UIU/ML (ref 0.45–4.5)
WBC # BLD AUTO: 5.35 THOUSAND/UL (ref 4.31–10.16)

## 2022-06-17 PROCEDURE — 83036 HEMOGLOBIN GLYCOSYLATED A1C: CPT

## 2022-06-17 PROCEDURE — 85025 COMPLETE CBC W/AUTO DIFF WBC: CPT

## 2022-06-17 PROCEDURE — 80061 LIPID PANEL: CPT

## 2022-06-17 PROCEDURE — 80053 COMPREHEN METABOLIC PANEL: CPT

## 2022-06-17 PROCEDURE — 84443 ASSAY THYROID STIM HORMONE: CPT

## 2022-06-17 PROCEDURE — 36415 COLL VENOUS BLD VENIPUNCTURE: CPT

## 2022-08-06 DIAGNOSIS — F33.42 MAJOR DEPRESSIVE DISORDER, RECURRENT, IN FULL REMISSION (HCC): ICD-10-CM

## 2022-08-07 RX ORDER — VENLAFAXINE HYDROCHLORIDE 75 MG/1
CAPSULE, EXTENDED RELEASE ORAL
Qty: 270 CAPSULE | Refills: 1 | Status: SHIPPED | OUTPATIENT
Start: 2022-08-07 | End: 2022-09-13 | Stop reason: SDUPTHER

## 2022-08-11 ENCOUNTER — OFFICE VISIT (OUTPATIENT)
Dept: OBGYN CLINIC | Facility: CLINIC | Age: 22
End: 2022-08-11
Payer: COMMERCIAL

## 2022-08-11 VITALS — SYSTOLIC BLOOD PRESSURE: 110 MMHG | BODY MASS INDEX: 29.43 KG/M2 | DIASTOLIC BLOOD PRESSURE: 74 MMHG | WEIGHT: 194.2 LBS

## 2022-08-11 DIAGNOSIS — R10.2 PELVIC PAIN: Primary | ICD-10-CM

## 2022-08-11 PROCEDURE — 99202 OFFICE O/P NEW SF 15 MIN: CPT | Performed by: OBSTETRICS & GYNECOLOGY

## 2022-08-11 NOTE — PROGRESS NOTES
Assessment/Plan:    We discussed the various etiologies of chronic pelvic pain  We discussed initiation an evaluation with pelvic ultrasound  We discussed possible endometriosis, and how this is ultimately diagnosed surgically with diagnostic laparoscopy  We then discussed IUDs  We discussed non-hormonal Paragard - and how this may cause worsening of her cycles and menstrual pain  We then spoke about how a hormonal IUD like Desi Colla may be a better choice for her, as this may improve her pelvic pain in addition to prevent pregnancy  We discussed that it has a localized effect, not systemic - and she may tolerate this better  She was provided with written information on the Desi Colla and Paragard and she will consider her options  We will follow up and make plan pending her ultrasound results  Problem List Items Addressed This Visit    None     Visit Diagnoses     Pelvic pain    -  Primary    Relevant Orders    US pelvis complete w transvaginal            Subjective:      Patient ID: Shawn Bender is a 25 y o  female  Martha Homes is a pleasant 18yo who presents with ovarian pain  She reports the ovarian pain has been present since age 8 when she first underwent menses  She had an ultrasound at 15years old that revealed ovarian cysts and has not had a ultrasound since  Her last gynecologist did not want to do an [address] and said her pain was not that bad, and this is why she left them  She reports the pain is worse on the left side but does occur on the right  The pain is worse around ovulation and menstruation  She reports this pelvic pain 2-3 days per week  She describes the pain as both sharp and dull, but when she has either, it is constant  She reports 8/10 pain when sharp and 5/10 when dull  She has tried Tylenol and heating pads with no relief  Advil provides some relief and it is affecting her daily life  The pain does not radiate and last all day, she normally just waits for it to go away  Nothing truly makes the pain better aside from the Advil but no activities make the pain worse  She reports her period when she was younger used to be more painful and heavier but now is more regulated about every 30 days with 2-3 for her period although this pelvic pain is more frequent between periods now  She reports no urinary symptoms such as incontinence, blood in the urine, pain, or burning  No changes in her bowels and no pain with sex  She is in a monogamous relationship for 5 years, uses the pullout method, and had STD testing last year  She inquired about the nonhormonal IUD  She had a pap in 2021 that was normal      She did use OCPs at age [age]yo for about 8 months  She does not remember if this helped her pain or not  She stopped them because she "didn't feel right"    The following portions of the patient's history were reviewed and updated as appropriate: allergies, current medications, past family history, past medical history, past social history, past surgical history and problem list     Review of Systems   Genitourinary: Positive for pelvic pain (left predominate but also right sided)  Negative for dyspareunia, dysuria, frequency, hematuria and vaginal bleeding  Objective:      /74 (BP Location: Right arm, Patient Position: Sitting, Cuff Size: Standard)   Wt 88 1 kg (194 lb 3 2 oz)   LMP 07/29/2022 (Exact Date)   BMI 29 43 kg/m²          Physical Exam  Vitals and nursing note reviewed  Exam conducted with a chaperone present  Constitutional:       General: She is not in acute distress  Appearance: Normal appearance  She is not ill-appearing  Abdominal:      Palpations: Abdomen is soft  Tenderness: There is no abdominal tenderness  Genitourinary:     General: Normal vulva  Labia:         Right: No rash or lesion  Left: No rash or lesion  Vagina: No bleeding  Cervix: No cervical motion tenderness        Uterus: Normal  Not enlarged and not tender  Adnexa: Right adnexa normal and left adnexa normal         Right: No mass, tenderness or fullness  Left: No mass, tenderness or fullness  Neurological:      Mental Status: She is alert

## 2022-08-12 ENCOUNTER — TELEPHONE (OUTPATIENT)
Dept: ADMINISTRATIVE | Facility: OTHER | Age: 22
End: 2022-08-12

## 2022-08-12 ENCOUNTER — OFFICE VISIT (OUTPATIENT)
Dept: FAMILY MEDICINE CLINIC | Facility: CLINIC | Age: 22
End: 2022-08-12
Payer: COMMERCIAL

## 2022-08-12 VITALS
HEART RATE: 84 BPM | TEMPERATURE: 97.3 F | DIASTOLIC BLOOD PRESSURE: 70 MMHG | OXYGEN SATURATION: 98 % | WEIGHT: 193.4 LBS | HEIGHT: 65 IN | BODY MASS INDEX: 32.22 KG/M2 | SYSTOLIC BLOOD PRESSURE: 108 MMHG | RESPIRATION RATE: 18 BRPM

## 2022-08-12 DIAGNOSIS — E55.9 VITAMIN D DEFICIENCY: ICD-10-CM

## 2022-08-12 DIAGNOSIS — E66.9 OBESITY (BMI 30-39.9): ICD-10-CM

## 2022-08-12 DIAGNOSIS — F42.2 MIXED OBSESSIONAL THOUGHTS AND ACTS: ICD-10-CM

## 2022-08-12 DIAGNOSIS — F33.8 SEASONAL AFFECTIVE DISORDER (HCC): ICD-10-CM

## 2022-08-12 DIAGNOSIS — Z00.00 HEALTH CARE MAINTENANCE: Primary | ICD-10-CM

## 2022-08-12 PROBLEM — F17.200 NICOTINE USE DISORDER: Status: RESOLVED | Noted: 2021-07-12 | Resolved: 2022-08-12

## 2022-08-12 PROCEDURE — 99395 PREV VISIT EST AGE 18-39: CPT | Performed by: FAMILY MEDICINE

## 2022-08-12 NOTE — PROGRESS NOTES
Assessment/Plan:  Chief Complaint   Patient presents with    Physical Exam     Yearly exam pt had Pap smear done last year sent message to [facility]       Patient Instructions   Here for general PE and graduated recently and going for masters online this January [employer]  F-up with GYN as directed for pelvic pain and has US that needs to be done  Labs stable  Take Effexor XR as directed for SAD and OCD  Patient is Vegan  No problem-specific Assessment & Plan notes found for this encounter  Diagnoses and all orders for this visit:    Health care maintenance    Mixed obsessional thoughts and acts  Comments:  stable    Seasonal affective disorder ([address] )  Comments:  stable    Vitamin D deficiency    Obesity (BMI 30-39  9)          Subjective:      Patient ID: Jocelyn Harden is a 25 y o  female  Here for general PE and is a vegan and does exercise and graduated  Works at [employer]  Starting masters online this January 2023  Labs stable  The following portions of the patient's history were reviewed and updated as appropriate: allergies, current medications, past family history, past medical history, past social history, past surgical history and problem list     Review of Systems   Constitutional: Negative  HENT: Negative  Eyes: Negative  Respiratory: Negative  Cardiovascular: Negative  Gastrointestinal: Negative  Endocrine: Negative  Genitourinary: Negative  Musculoskeletal: Negative  Skin: Negative  Allergic/Immunologic: Negative  Neurological: Negative  Hematological: Negative  Psychiatric/Behavioral: Negative            Objective:      /70 (BP Location: Left arm, Patient Position: Sitting, Cuff Size: Adult)   Pulse 84   Temp (!) 97 3 °F (36 3 °C) (Temporal)   Resp 18   Ht 5' 5" (1 651 m)   Wt 87 7 kg (193 lb 6 4 oz)   LMP 07/29/2022 (Exact Date)   SpO2 98%   BMI 32 18 kg/m²          Physical Exam  Constitutional:       Appearance: She is well-developed  She is obese  HENT:      Head: Normocephalic and atraumatic  Right Ear: External ear normal       Left Ear: External ear normal       Nose: Nose normal       Mouth/Throat:      Mouth: Mucous membranes are moist    Eyes:      Conjunctiva/sclera: Conjunctivae normal       Pupils: Pupils are equal, round, and reactive to light  Cardiovascular:      Rate and Rhythm: Normal rate and regular rhythm  Heart sounds: Normal heart sounds  Pulmonary:      Effort: Pulmonary effort is normal       Breath sounds: Normal breath sounds  Abdominal:      General: Abdomen is flat  Bowel sounds are normal       Palpations: Abdomen is soft  Musculoskeletal:         General: Normal range of motion  Cervical back: Normal range of motion and neck supple  Skin:     General: Skin is warm and dry  Neurological:      Mental Status: She is alert and oriented to person, place, and time  Deep Tendon Reflexes: Reflexes are normal and symmetric     Psychiatric:         Behavior: Behavior normal

## 2022-08-12 NOTE — TELEPHONE ENCOUNTER
----- Message from Jeffery Johnson sent at 8/12/2022  9:02 AM EDT -----  Regarding: [facility] request pap  08/12/22 9:03 AM    Hello, our patient Héctor Hill has had Pap Smear (HPV) aka Cervical Cancer Screening completed/performed  Please assist in updating the patient chart by pulling the Care Everywhere (CE) document  The date of service is 04/21/2021       Thank you,  Jeffery Johnson  [facility] [phone]

## 2022-08-12 NOTE — PATIENT INSTRUCTIONS
Here for general PE and graduated recently and going for masters online this January [employer]  F-up with GYN as directed for pelvic pain and has US that needs to be done  Labs stable  Take Effexor XR as directed for SAD and OCD  Patient is Vegan

## 2022-08-15 NOTE — TELEPHONE ENCOUNTER
Upon review of the In Basket request we were able to locate, review, and update the patient chart as requested for Pap Smear (HPV) aka Cervical Cancer Screening  Any additional questions or concerns should be emailed to the Practice Liaisons via [employer]@"[facility]"  org email, please do not reply via In Basket      Thank you  Marcelina Patel

## 2022-08-30 ENCOUNTER — HOSPITAL ENCOUNTER (OUTPATIENT)
Dept: ULTRASOUND IMAGING | Facility: MEDICAL CENTER | Age: 22
Discharge: HOME | End: 2022-08-30
Payer: COMMERCIAL

## 2022-08-30 DIAGNOSIS — R10.2 PELVIC PAIN: ICD-10-CM

## 2022-08-30 PROCEDURE — 76856 US EXAM PELVIC COMPLETE: CPT

## 2022-08-30 PROCEDURE — 76830 TRANSVAGINAL US NON-OB: CPT

## 2022-09-02 ENCOUNTER — TELEPHONE (OUTPATIENT)
Dept: OBGYN CLINIC | Facility: CLINIC | Age: 22
End: 2022-09-02

## 2022-09-02 NOTE — TELEPHONE ENCOUNTER
Other option for evaluation would be surgery - a diagnostic laparoscopy to look for evidence of endometriosis and potentially treat areas if this is present    If would like to pursue can schedule OVS

## 2022-09-02 NOTE — TELEPHONE ENCOUNTER
----- Message from Verma Spurling, MD sent at 9/2/2022  9:52 AM EDT -----  Pelvic ultrasound reviewed, normal    We had discussed IUDs - has she made a decision on this?

## 2022-09-02 NOTE — TELEPHONE ENCOUNTER
----- Message from Inocencio Dunlap sent at 9/2/2022 11:20 AM EDT -----  Regarding: results  Hi,   I am still deciding on that  Are there any other steps to take for my pain in order to diagnose it?

## 2022-09-13 ENCOUNTER — TELEPHONE (OUTPATIENT)
Dept: PSYCHIATRY | Facility: CLINIC | Age: 22
End: 2022-09-13

## 2022-09-13 ENCOUNTER — OFFICE VISIT (OUTPATIENT)
Dept: PSYCHIATRY | Facility: CLINIC | Age: 22
End: 2022-09-13

## 2022-09-13 ENCOUNTER — DOCUMENTATION (OUTPATIENT)
Dept: PSYCHIATRY | Facility: CLINIC | Age: 22
End: 2022-09-13

## 2022-09-13 VITALS — WEIGHT: 197 LBS | BODY MASS INDEX: 32.78 KG/M2 | DIASTOLIC BLOOD PRESSURE: 79 MMHG | SYSTOLIC BLOOD PRESSURE: 113 MMHG

## 2022-09-13 DIAGNOSIS — F33.42 MAJOR DEPRESSIVE DISORDER, RECURRENT, IN FULL REMISSION (HCC): Primary | ICD-10-CM

## 2022-09-13 DIAGNOSIS — F41.1 GENERALIZED ANXIETY DISORDER: ICD-10-CM

## 2022-09-13 DIAGNOSIS — R46.81 OBSESSIVE-COMPULSIVE BEHAVIOR: ICD-10-CM

## 2022-09-13 PROCEDURE — NC001 PR NO CHARGE: Performed by: PSYCHIATRY & NEUROLOGY

## 2022-09-13 RX ORDER — VENLAFAXINE HYDROCHLORIDE 75 MG/1
225 CAPSULE, EXTENDED RELEASE ORAL DAILY
Qty: 90 CAPSULE | Refills: 2 | Status: SHIPPED | OUTPATIENT
Start: 2022-09-13 | End: 2022-10-07

## 2022-09-13 NOTE — TELEPHONE ENCOUNTER
Per Dr Tomas Olvera, no new appointment will be needed  Patient is doing well and will not be returning

## 2022-09-13 NOTE — PSYCH
MEDICATION MANAGEMENT NOTE        [place] [id number] [facility]      Name and Date of Birth:  Ira Ojeda 25 y o  2000    Date of Visit: 2022    SUBJECTIVE:  CC: Amando Fang presents today for follow up with MDD, recurrent, in full remission, BERTA, and Obsessive-compulsive behaviors  Indicates she is doing "good, I have a day off " Indicates she is getting her flu/covid shot today and has therapy later as well  Denies feeling depressed or anxious  Sleep has been good and she is getting 7 5 hours usually and feels rested on awakening  Appetite has been good  Diet has been consistent with vegetation and protein  She is unsure why she would be gaining weight  F/u is occurring with OBGYN because of this and pain in lower abdomen to r/o endometriosis  Physical activity has been consistent at 3 times per week  Currently filling out applications for masters programs in environmental science which would start in January  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  Now starting work as a  at [employer]  PCP follow up has occurred and not issues to report  Night sweats with moving Effexor to morning have continued and thinks this could be from gynecological problem  Patient was able to acquire labs follow previous visit  Obsessive thoughts and compulsive actions have not been an issue for her  Amando Fang denies any side effects from medications unless noted above    Since our last visit, overall symptoms have been unchanged       HPI ROS:     PHQ-9 Depression Screening    Little interest or pleasure in doing things: 0 - not at all  Feeling down, depressed, or hopeless: 1 - several days  Trouble falling or staying asleep, or sleeping too much: 0 - not at all  Feeling tired or having little energy: 2 - more than half the days  Poor appetite or overeatin - not at all  Feeling bad about yourself - or that you are a failure or have let yourself or your family down: 0 - not at all  Trouble concentrating on things, such as reading the newspaper or watching television: 0 - not at all  Moving or speaking so slowly that other people could have noticed  Or the opposite - being so fidgety or restless that you have been moving around a lot more than usual: 0 - not at all  Thoughts that you would be better off dead, or of hurting yourself in some way: 0 - not at all  PHQ-9 Score: 3  Score Interpretation: No or Minimal depression         PHQ-2/9 Depression Screening    Little interest or pleasure in doing things: 0 - not at all  Feeling down, depressed, or hopeless: 1 - several days  Trouble falling or staying asleep, or sleeping too much: 0 - not at all  Feeling tired or having little energy: 2 - more than half the days  Poor appetite or overeatin - not at all  Feeling bad about yourself - or that you are a failure or have let yourself or your family down: 0 - not at all  Trouble concentrating on things, such as reading the newspaper or watching television: 0 - not at all  Moving or speaking so slowly that other people could have noticed  Or the opposite - being so fidgety or restless that you have been moving around a lot more than usual: 0 - not at all  Thoughts that you would be better off dead, or of hurting yourself in some way: 0 - not at all  PHQ-9 Score: 3   PHQ-9 Interpretation: No or Minimal depression        BERTA-7 Flowsheet Screening    Flowsheet Row Most Recent Value   Over the last 2 weeks, how often have you been bothered by any of the following problems?     Feeling nervous, anxious, or on edge 1   Not being able to stop or control worrying 1   Worrying too much about different things 0   Trouble relaxing 0   Being so restless that it is hard to sit still 0   Becoming easily annoyed or irritable 1   Feeling afraid as if something awful might happen 0   BERTA-7 Total Score 3        HPI ROS:             ('was' notes: recent => remote)  Medication Side Effects:  Still night sweats  Night sweats on Effexor   Depression (10 worst): 3/10 (Was 2/10)   Anxiety (10 worst): 2/10 (Was 1/10)   Safety concerns (SI, HI, etc): Denied SI/HI/AVH (Was Denied SI/HI/AVH)   Sleep: (NM = Nightmares) WNL at 7 5+ hours per night and feeling rested (Was WNL at 9 hours per night)   Energy: Below baseline but "ok" (Was WNL)   Appetite: WNL (Was WNL)   Weight Change: Gained 4 lbs in one month Gained 10 lbs in 3 months     Review Of Systems as noted above  In addition:     Constitutional recent weight gain (4 lbs)   ENT negative   Cardiovascular negative   Respiratory negative   Gastrointestinal negative   Genitourinary negative   Musculoskeletal negative   Integumentary negative   Neurological negative   Endocrine excessive sweating   Other Symptoms none     Pain none   Pain Scale 0     History Review:  The following portions of the patient's history were reviewed and updated as appropriate: allergies, current medications, past family history, past medical history, past social history, past surgical history and problem list      Lab Review: Labs were reviewed and discussed with patient      OBJECTIVE:     MENTAL STATUS EXAM  Appearance:  age appropriate, dressed casually, overweight   Behavior:  pleasant, cooperative, with good eye contact   Speech:  Normal volume, regular rate and rhythm   Mood:  "good, I have a day off "   Affect:  mood congruent, euthymic   Language: intact and appropriate for age, education, and intellect   Thought Process:  Linear and goal directed   Associations: intact associations   Thought Content:  normal and appropriate   Perceptual Disturbances: no auditory or visual hallcunations   Risk Potential / Abnormal Thoughts: Suicidal ideation - None  Homicidal ideation - None  Potential for aggression - No       Consciousness:  Alert & Awake   Sensorium:  Grossly oriented   Attention: attention span and concentration are age appropriate       Fund of Knowledge:  Memory: awareness of current events: yes  past history: yes  vocabulary: yes  recent and remote memory grossly intact   Insight:  good   Judgment: good   Muscle Strength Muscle Tone: normal  normal   Gait/Station: normal gait/station with good balance   Motor Activity: no abnormal movements       Risks, Benefits And Possible Side Effects Of Medications:    AGREE: Risks, benefits, and possible side effects of medications explained to Sasha Quinn and she (or legal representative) verbalizes understanding and agreement for treatment  Controlled Medication Discussion:     Not applicable  ______________________________________________________________      Recent labs:  No visits with results within 1 Month(s) from this visit     Latest known visit with results is:   Appointment on 06/17/2022   Component Date Value    WBC 06/17/2022 5 35     RBC 06/17/2022 4 66     Hemoglobin 06/17/2022 14 3     Hematocrit 06/17/2022 43 5     MCV 06/17/2022 93     MCH 06/17/2022 30 7     MCHC 06/17/2022 32 9     RDW 06/17/2022 12 7     MPV 06/17/2022 10 4     Platelets 93/71/5698 264     nRBC 06/17/2022 0     Neutrophils Relative 06/17/2022 53     Immat GRANS % 06/17/2022 0     Lymphocytes Relative 06/17/2022 30     Monocytes Relative 06/17/2022 9     Eosinophils Relative 06/17/2022 7 (A)    Basophils Relative 06/17/2022 1     Neutrophils Absolute 06/17/2022 2 84     Immature Grans Absolute 06/17/2022 0 01     Lymphocytes Absolute 06/17/2022 1 62     Monocytes Absolute 06/17/2022 0 46     Eosinophils Absolute 06/17/2022 0 37     Basophils Absolute 06/17/2022 0 05     Sodium 06/17/2022 139     Potassium 06/17/2022 4 3     Chloride 06/17/2022 110 (A)    CO2 06/17/2022 24     ANION GAP 06/17/2022 5     BUN 06/17/2022 7     Creatinine 06/17/2022 0 80     Glucose, Fasting 06/17/2022 77     Calcium 06/17/2022 9 0     AST 06/17/2022 17     ALT 06/17/2022 29     Alkaline Phosphatase 06/17/2022 53     Total Protein 06/17/2022 7 4     Albumin 06/17/2022 3 9     Total Bilirubin 06/17/2022 0 45     eGFR 06/17/2022 104     TSH 3RD GENERATON 06/17/2022 1 670     Hemoglobin A1C 06/17/2022 4 7     EAG 06/17/2022 88     Cholesterol 06/17/2022 161     Triglycerides 06/17/2022 63     HDL, Direct 06/17/2022 64     LDL Calculated 06/17/2022 84          Past Psychiatric History  Previous diagnoses include: Anxiety and depression  Prior outpatient psychiatric treatment: None  Prior therapy: 3 months, 3 years ago  Disliked it  Prior inpatient psychiatric treatment: Denies  Prior suicide attempts: Denies  Prior self harm: Denies  Prior violence or aggression: Denies     Previous psychotropic medication trials:      Antidepressants: Zoloft     Mood Stabilizers: None     Anxiolytics: None     Typical antipsychotics: None     Atypical antipsychotics: None     Hypnotics/sleep aids: None     Social History:     Childhood was described as "good, mom made up for dad"      During childhood, mom supported patient while father abandoned her and participated in drug activity  They have 0 sister(s) and 2 half brother(s)  Patient is 3 in birth order     Abuse/neglect: none     As far as the patient (or present family member) is aware, overall childhood development: Patient does ascribe to normal developmental including childhood as well as regular educational course overall (eg no IEP, special education)      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  Marital status: Single, Boyfriend  Children: None  Current Living Situation: the patient currently lives with mother in [place], [place]   Social support: Mom, few friends     Hindu Affiliation: None   experience: Denies  Legal history: Denies  Access to Guns: Denies     Substance use and treatment:  Tobacco use: Denies but does use vape for past 5 years  Quit 1 month ago    Caffeine Use: 2 cups/day  ETOH use: occasional   Other substance use: Denies     Endorses previous experimentation with: MJ in high school     Longest clean time: 3 months clean from vaping  History of Inpatient/Outpatient rehabilitation program: no        Traumatic History:      Abuse: none  Other Traumatic Events: none      Family Psychiatric History:      Psychiatric Illness:      Father - depression  Substance Abuse:       All grandparents alcohol use disorder  Suicide Attempts:        no family history of suicide attempts    Family Psychiatric History:   Family History   Problem Relation Age of Onset    Depression Father     Heart disease Maternal Grandmother     Heart disease Paternal Grandfather          Medical / Surgical History:    Past Medical History:   Diagnosis Date    Allergy     last assessed: 12/4/2013    Anxiety     Depression     Dysuria     last assessed: 3/5/2014    Kyphosis, acquired     last assessed: 1/17/2018    Murmur     last assessed: 6/24/2014    Nicotine use disorder 7/12/2021     Past Surgical History:   Procedure Laterality Date    WISDOM TOOTH EXTRACTION         Assessment/Plan:        Diagnoses and all orders for this visit:    Major depressive disorder, recurrent, in full remission ([address] )  -     venlafaxine (EFFEXOR-XR) 75 mg 24 hr capsule; Take 3 capsules (225 mg total) by mouth daily    Generalized anxiety disorder        ______________________________________________________________________    Additional Assessment:  Patient endorses good stability of mood and anxiety symptoms based on objective and subjective measurements  Reports that she has normal physiologic levels of both these symptoms and denies requiring any changes to her medication regimen and Effexor will be continued without change at this time  Reports that she is sleeping well with good consolidated sleep and feeling refreshed in the morning  Reports that her diet has been good with vegetation and protein consistently incorporated  Reports that she is exercising regularly approximately 3 times a day  Does report some mild weight gain and continues to have some sweating at night despite moving Effexor to the morning time  Reports that she is following up with her OBGYN for further workup for suspected endometriosis  Indicates that she did go to a primary care appointment with PCP and had no concerning information to report and says that she is completely stable from PCPs perspective  Patient did acquire laboratories which were completely unremarkable and within normal limits  Patient was in agreement with discontinuation of psychiatric care as she is no longer in need of this and feels comfortable to follow-up with primary care provider for continuation of Effexor now that she is on a stable regimen which has not been changed in multiple follow-up visits  Patient thinks provider for being involved with her care and voiced appreciation for services she did receive  States that she would reach out to clinic again to reestablish care if this became something that she needs in the future  Scales:  PHQ-9 was 2, now 3  - Prior 2, 4    BERTA-7 was stable, now 3  - Prior 4, 2         Treatment Plan:        Patient has been educated about their diagnosis and treatment modalities  They voiced understanding and agreement with the following plan:    Discussed medications and if treatment adjustment was needed/desired  1) MEDS:           - Continue Effexor 225 mg PO QD for mood, anxiety, and OCD symptoms  PARQ completed including serotonin syndrome, SIADH, worsened depression/suicidality, induction of carmelita, blood pressure changes and GI distress, weight gain, sexual side effects, insomnia, sedation, potential for drug interactions, and others     2) Labs:   - 7/25/21: Vitamin D level: 31 8 wnl  TSH 1 330 wnl  CBC unremarkable    6/17/22: CBC, CMP, TSH, Lipid panel, and A1c were all WNL and unremarkable      3) Therapy:    - Continue with own therapist at school and continues virtually     4) Medical:    - Pt will f/u with other providers as needed   - F/u with OBGYN for diagnostic lab to f/u endometriosis     5) Other: Support as needed   - Continue to obtain quality sleep   - Recommended continue diet and exercise regimens and healthy eating habits     6) Follow up:   - Patient is in agreement with this provider of stability and wishes to continue care with PCP and follow up with therapist   - Patient will call if issues or concerns      7) Treatment Plan:   - Enacted 6/10/22     Discussed self monitoring of symptoms, and symptom monitoring tools  Patient has been informed of 24 hours and weekend coverage for urgent situations accessed by calling the main clinic phone number               Psychotherapy in session:  Time spent performing psychotherapy: 16 Minutes

## 2022-09-13 NOTE — PSYCH
Psychiatric Discharge Summary     Admit Date: 7/12/21  Discharge Date: 9/13/22    Discharge Diagnosis:  1  Major depressive disorder, recurrent, in full remission ([address] )     2  Generalized anxiety disorder     3  Obsessive-compulsive behavior         Treating Physician: Mick Bustos DO    Prognosis at time of discharge: Excellent     Presenting Problems/Pertinent Findings:   Patient originally presented to clinic showing signs of mild episode of major depressive disorder, unspecified OCD symptoms, nicotine use disorder, and ADHD  Patient had history of bipolar 2 disorder but this diagnosis was deemed to be insufficient and instead seemed to be more related to baseline anxiety and possible OCD symptoms  ADHD symptoms were on differential as she did endorse concentration problems in school  Decision was made at that time to start patient on Pristiq for depression and OCD like symptoms  Patient declined nicotine therapy at that time  Ordered vitamin-D level which did come back in low normal range on subsequent visits and patient was encouraged to increase intake in her diet and have better exposure to sunlight  Prior authorization was encountered with Pristiq and patient was started on Effexor instead at low dose  Patient did report skin picking issues but did not have any overt excoriations  Had been maintaining job very well with good motivation and goal orientation  No indications were seen for stimulant use at this time as she seemed to be engaging in doing well in school  Patient did report that she had discontinued cigarettes which was likely contributing to her improvement  Stated that she was utilizing progressive relaxation techniques to help her sleep  Originally had death anxiety concerns which resolved on subsequent visits  Patient was established with psychotherapy and Effexor was titrated to effectiveness    Patient's OCD symptoms seem to mostly be related to her school stress and these resolved throughout the course of treatment as she learned better coping skills to manage distress  Patient was engaging with peers at school and developing friendships while additionally increasing her physical activity  OCD symptoms came under better control on high dose of serotonergic agents and diagnosis was changed to obsessive-compulsive behaviors as she never met full criteria for OCD by dsm criteria as her behaviors were not noticeably disruptive to her life or causing impairment from her point of view  Indicates that she is able to take test well and was able to cope with mild disruptions  Patient's depressive symptoms also resolved which likely contributed to improvement of her concentration and energy level as well  Did offer BuSpar for additional help with residual anxiety to which she declined and opted for more conservative methods including follow-up with psychotherapy which was noticeably beneficial to her over the course of treatment  Further improvement was noted after school finished in 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 [employer] representing a change in her career path which also greatly reduced her anxiety level as she began to find what it is that she likes to do  Patient was encouraged to follow-up with PCP in acquired laboratories ordered by this provider which were completely benign and within normal limits  Patient did gain small amount of weight prior to discharge but was actively following up with OBGYN for workup of suspected endometriosis  Nighttime sweating was continued at night despite changes in Effexor towards the morning time  Patient's regimen had remained consistent for multiple months and endorse that she no longer needed strict psychiatric care and that PCP would be adequate to manage her medications and she would continue with psychotherapy    Both patient and physician agreed that treatment goals were met and she was appropriate for discharge at that time       Therapist: Outside    Course of Treatment: Medication Management and Psych Eval      To what extent did the consumer achieve their goals? All    Criteria for Discharge: Have completed tx goals and objectives and are no longer in need of services    Aftercare Recommendations: Follow up with pcp    Discharge Medications:   Current Outpatient Medications:     albuterol (PROVENTIL HFA,VENTOLIN HFA) 90 mcg/act inhaler, INHALE 2 PUFFS 20 MIN PRIOR TO EXERCISE AND UPTO EVERY 4 HOURS AS NEEDED, Disp: , Rfl:     clindamycin (CLEOCIN T) 1 % lotion, apply thin layer to face twice a day, Disp: , Rfl:     mometasone (NASONEX) 50 mcg/act nasal spray, 2 sprays daily, Disp: , Rfl:     olopatadine (PATANOL) 0 1 % ophthalmic solution, instill 1 drop into both eyes twice a day if needed, Disp: , Rfl:     venlafaxine (EFFEXOR-XR) 75 mg 24 hr capsule, Take 3 capsules (225 mg total) by mouth daily, Disp: 90 capsule, Rfl: 2     Describe consumers ability and willingness to work and solve her mental problem  Patient showed excellent ability to adhere to treatment plan and willingness to engage in her mental health care  Patient was told that she could re-established with clinic if she needed to in the future if her psychiatric symptoms were to worsen to which he voiced agreement and appreciation to this provider  Past Psychiatric History  Previous diagnoses include: Anxiety and depression  Prior outpatient psychiatric treatment: None  Prior therapy: 3 months, 3 years ago   Disliked it  Prior inpatient psychiatric treatment: Denies  Prior suicide attempts: Denies  Prior self harm: Denies  Prior violence or aggression: Denies     Previous psychotropic medication trials:      Antidepressants: Zoloft     Mood Stabilizers: None     Anxiolytics: None     Typical antipsychotics: None     Atypical antipsychotics: None     Hypnotics/sleep aids: None     Social History:     Childhood was described as "good, mom made up for dad"      During childhood, mom supported patient while father abandoned her and participated in drug activity  They have 0 sister(s) and 2 half brother(s)  Patient is 3 in birth order     Abuse/neglect: none     As far as the patient (or present family member) is aware, overall childhood development: Patient does ascribe to normal developmental including childhood as well as regular educational course overall (eg no IEP, special education)      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  Marital status: Single, Boyfriend  Children: None  Current Living Situation: the patient currently lives with mother in [place], [place]   Social support: Mom, few friends     Jehovah's witness Affiliation: None   experience: Denies  Legal history: Denies  Access to Guns: Denies     Substance use and treatment:  Tobacco use: Denies but does use vape for past 5 years  Quit 1 month ago  Caffeine Use: 2 cups/day  ETOH use: occasional   Other substance use: Denies     Endorses previous experimentation with: MJ in high school     Longest clean time: 3 months clean from vaping  History of Inpatient/Outpatient rehabilitation program: no        Traumatic History:      Abuse: none  Other Traumatic Events: none      Family Psychiatric History:      Psychiatric Illness:      Father - depression  Substance Abuse:       All grandparents alcohol use disorder  Suicide Attempts:        no family history of suicide attempts      The following portions of the patient's history were reviewed and updated as appropriate: allergies, current medications, past family history, past medical history, past social history, past surgical history and problem list       Mental Status at Time of most recent visit on 9/13/22       MENTAL STATUS EXAM  Appearance:  age appropriate, dressed casually, overweight   Behavior:  pleasant, cooperative, with good eye contact   Speech:  Regular rate and rhythm, soft   Mood:  "good "   Affect:  mood congruent, euthymic   Language: intact and appropriate for age, education, and intellect   Thought Process:  Linear and goal directed   Associations: intact associations   Thought Content:  negative thinking and cognitive distortions   Perceptual Disturbances: no auditory or visual hallcunations   Risk Potential / Abnormal Thoughts: Suicidal ideation - None  Homicidal ideation - None  Potential for aggression - No         Consciousness:  Alert & Awake   Sensorium:  Grossly oriented   Attention: attention span and concentration are improving         Fund of Knowledge:  Memory: awareness of current events: yes  past history: yes  vocabulary: yes  recent and remote memory grossly intact   Insight:  good   Judgment: good   Muscle Strength Muscle Tone: normal  normal   Gait/Station: normal gait/station with good balance   Motor Activity: no abnormal movements

## 2022-10-07 ENCOUNTER — TELEPHONE (OUTPATIENT)
Dept: FAMILY MEDICINE CLINIC | Facility: CLINIC | Age: 22
End: 2022-10-07

## 2022-10-07 DIAGNOSIS — F33.42 MAJOR DEPRESSIVE DISORDER, RECURRENT, IN FULL REMISSION (HCC): ICD-10-CM

## 2022-10-07 RX ORDER — VENLAFAXINE HYDROCHLORIDE 75 MG/1
CAPSULE, EXTENDED RELEASE ORAL
Qty: 270 CAPSULE | Refills: 1 | Status: SHIPPED | OUTPATIENT
Start: 2022-10-07

## 2022-10-07 NOTE — TELEPHONE ENCOUNTER
----- Message from Vianney Zavala DO sent at 10/7/2022  1:38 PM EDT -----  Regarding: FW: Effexor  Notify patient    ----- Message -----  From: Du Atkinson DO  Sent: 10/7/2022   1:35 PM EDT  To: Vianney Zavala DO  Subject: Effexor                                          Hello there, this patient was recently discharged from this clinic's care  She is on Effexor which has a very nasty discontinuation syndrome if stopped  I will renew her medication one more time but then she will need to follow up with you for further refills  Thank you

## 2023-03-28 ENCOUNTER — OFFICE VISIT (OUTPATIENT)
Dept: FAMILY MEDICINE CLINIC | Facility: CLINIC | Age: 23
End: 2023-03-28

## 2023-03-28 VITALS
WEIGHT: 199.8 LBS | BODY MASS INDEX: 33.29 KG/M2 | TEMPERATURE: 96.3 F | DIASTOLIC BLOOD PRESSURE: 74 MMHG | SYSTOLIC BLOOD PRESSURE: 128 MMHG | OXYGEN SATURATION: 99 % | HEART RATE: 80 BPM | RESPIRATION RATE: 18 BRPM | HEIGHT: 65 IN

## 2023-03-28 DIAGNOSIS — J30.2 SEASONAL ALLERGIES: ICD-10-CM

## 2023-03-28 DIAGNOSIS — F42.2 MIXED OBSESSIONAL THOUGHTS AND ACTS: Primary | ICD-10-CM

## 2023-03-28 DIAGNOSIS — F33.8 SEASONAL AFFECTIVE DISORDER (HCC): ICD-10-CM

## 2023-03-28 DIAGNOSIS — E66.9 OBESITY (BMI 30-39.9): ICD-10-CM

## 2023-03-28 NOTE — PROGRESS NOTES
Name: Rg Sawant      : 2000      MRN: [id number]  Encounter Provider: Miguel Valdovinos DO  Encounter Date: 3/28/2023   Encounter department: [address] [place] [address]  Chief Complaint   Patient presents with   • Follow-up     Follow up for med      Patient Instructions    Follow-up (Follow up for med ), doing well and is on Effexor 75 mg 3 po QD since last September  No problems  Patient is outside more and takes Vitamin D3 daily and no longer sees psychiatry  No side effects with med  Patient will call office if needs refill  Patient getting master's at [employer] in [employer]  Works now at [address] [place] [address] as   Patient takes OTC allegra for seasonal allergies  Seasonal allergy precautions - on OTC allegra  Assessment & Plan     1  Mixed obsessional thoughts and acts    2  Seasonal affective disorder ([address] )  Comments:  stable    3  Obesity (BMI 30-39 9)    4  Seasonal allergies           Subjective      Follow-up (Follow up for med )    Review of Systems   Constitutional: Negative  HENT: Negative  Eyes: Negative  Respiratory: Negative  Cardiovascular: Negative  Gastrointestinal: Negative  Endocrine: Negative  Genitourinary: Negative  Musculoskeletal: Negative  Skin: Negative  Allergic/Immunologic: Negative  Neurological: Negative  Hematological: Negative  Psychiatric/Behavioral: Negative  Current Outpatient Medications on File Prior to Visit   Medication Sig   • albuterol (PROVENTIL HFA,VENTOLIN HFA) 90 mcg/act inhaler INHALE 2 PUFFS 20 MIN PRIOR TO EXERCISE AND UPTO EVERY 4 HOURS AS NEEDED   • clindamycin (CLEOCIN T) 1 % lotion apply thin layer to face twice a day   • venlafaxine (EFFEXOR-XR) 75 mg 24 hr capsule TAKE 3 CAPSULES BY MOUTH DAILY         Objective     /74 (BP Location: Left arm, Patient Position: Sitting, Cuff Size: Adult)   Pulse 80   Temp (!) 96 3 °F (35 7 °C) (Temporal)   Resp 18   Ht 5' "5\" (1 651 m)   Wt 90 6 kg (199 lb 12 8 oz)   SpO2 99%   BMI 33 25 kg/m²     Physical Exam  Constitutional:       Appearance: She is well-developed  HENT:      Head: Normocephalic and atraumatic  Eyes:      Conjunctiva/sclera: Conjunctivae normal       Pupils: Pupils are equal, round, and reactive to light  Cardiovascular:      Rate and Rhythm: Normal rate and regular rhythm  Pulses: Normal pulses  Heart sounds: Normal heart sounds  Pulmonary:      Effort: Pulmonary effort is normal       Breath sounds: Normal breath sounds  Musculoskeletal:         General: Normal range of motion  Cervical back: Normal range of motion and neck supple  Skin:     General: Skin is warm and dry  Capillary Refill: Capillary refill takes less than 2 seconds  Neurological:      General: No focal deficit present  Mental Status: She is alert and oriented to person, place, and time  Deep Tendon Reflexes: Reflexes are normal and symmetric  Psychiatric:         Mood and Affect: Mood normal          Behavior: Behavior normal          Thought Content:  Thought content normal          Judgment: Judgment normal        Bonnye Moment, DO  "

## 2023-03-28 NOTE — PATIENT INSTRUCTIONS
Follow-up (Follow up for med ), doing well and is on Effexor 75 mg 3 po QD since last September  No problems  Patient is outside more and takes Vitamin D3 daily and no longer sees psychiatry  No side effects with med  Patient will call office if needs refill  Patient getting master's at [employer] in [employer]  Works now at [address] [place] [address] as   Patient takes OTC allegra for seasonal allergies  Seasonal allergy precautions - on OTC allegra

## 2023-05-07 DIAGNOSIS — F33.42 MAJOR DEPRESSIVE DISORDER, RECURRENT, IN FULL REMISSION (HCC): ICD-10-CM

## 2023-05-07 RX ORDER — VENLAFAXINE HYDROCHLORIDE 75 MG/1
225 CAPSULE, EXTENDED RELEASE ORAL DAILY
Qty: 270 CAPSULE | Refills: 1 | Status: SHIPPED | OUTPATIENT
Start: 2023-05-07

## 2023-09-28 ENCOUNTER — OFFICE VISIT (OUTPATIENT)
Dept: FAMILY MEDICINE CLINIC | Facility: CLINIC | Age: 23
End: 2023-09-28
Payer: COMMERCIAL

## 2023-09-28 VITALS
HEART RATE: 98 BPM | RESPIRATION RATE: 16 BRPM | DIASTOLIC BLOOD PRESSURE: 74 MMHG | WEIGHT: 209.4 LBS | BODY MASS INDEX: 33.65 KG/M2 | SYSTOLIC BLOOD PRESSURE: 122 MMHG | TEMPERATURE: 96.3 F | HEIGHT: 66 IN | OXYGEN SATURATION: 99 %

## 2023-09-28 DIAGNOSIS — F42.2 MIXED OBSESSIONAL THOUGHTS AND ACTS: ICD-10-CM

## 2023-09-28 DIAGNOSIS — F33.8 SEASONAL AFFECTIVE DISORDER (HCC): ICD-10-CM

## 2023-09-28 DIAGNOSIS — Z13.220 SCREENING FOR HYPERLIPIDEMIA: ICD-10-CM

## 2023-09-28 DIAGNOSIS — Z00.00 HEALTH CARE MAINTENANCE: Primary | ICD-10-CM

## 2023-09-28 DIAGNOSIS — F33.42 MAJOR DEPRESSIVE DISORDER, RECURRENT, IN FULL REMISSION (HCC): ICD-10-CM

## 2023-09-28 DIAGNOSIS — E55.9 VITAMIN D DEFICIENCY: ICD-10-CM

## 2023-09-28 PROCEDURE — 99395 PREV VISIT EST AGE 18-39: CPT | Performed by: FAMILY MEDICINE

## 2023-09-28 RX ORDER — SPIRONOLACTONE 50 MG/1
TABLET, FILM COATED ORAL
COMMUNITY
Start: 2023-09-25

## 2023-09-28 RX ORDER — VENLAFAXINE HYDROCHLORIDE 75 MG/1
225 CAPSULE, EXTENDED RELEASE ORAL DAILY
Qty: 270 CAPSULE | Refills: 1 | Status: SHIPPED | OUTPATIENT
Start: 2023-09-28

## 2023-09-28 NOTE — PATIENT INSTRUCTIONS
Rec getting labs and also eating healthy and exercising and rec also to call if any problems. See GYN as directed and dental and eye exams as directed.  Patient to continue all same meds and recheck in 6 months and 1 year for general PE.

## 2023-09-28 NOTE — PROGRESS NOTES
Name: Eron Moore      : 2000      MRN: [id number]  Encounter Provider: Crystal Galaviz DO  Encounter Date: 2023   Encounter department: [address] [place] [address]  Chief Complaint   Patient presents with   • Physical Exam     Patient Instructions   Rec getting labs and also eating healthy and exercising and rec also to call if any problems. See GYN as directed and dental and eye exams as directed. Patient to continue all same meds and recheck in 6 months and 1 year for general PE. Assessment & Plan     1. Health care maintenance  -     Comprehensive metabolic panel; Future  -     CBC and differential; Future  -     Lipid Panel with Direct LDL reflex; Future  -     Vitamin D 25 hydroxy; Future    2. Vitamin D deficiency  -     Comprehensive metabolic panel; Future  -     Vitamin D 25 hydroxy; Future    3. Seasonal affective disorder ([address])  Comments:  stable on med    4. Mixed obsessional thoughts and acts    5. Screening for hyperlipidemia  -     Comprehensive metabolic panel; Future  -     Lipid Panel with Direct LDL reflex; Future    6. Major depressive disorder, recurrent, in full remission ([address])  Comments:  stable  Orders:  -     venlafaxine (EFFEXOR-XR) 75 mg 24 hr capsule; Take 3 capsules (225 mg total) by mouth daily           Subjective      Here for general PE and doing well with depression and also SAD and will need updated labs. Takes all meds as directed. Review of Systems   Constitutional: Negative. HENT: Negative. Eyes: Negative. Respiratory: Negative. Cardiovascular: Negative. Gastrointestinal: Negative. Endocrine: Negative. Genitourinary: Negative. Musculoskeletal: Negative. Skin: Negative. Allergic/Immunologic: Negative. Neurological: Negative. Hematological: Negative. Psychiatric/Behavioral: Negative.          Depression stable       Current Outpatient Medications on File Prior to Visit   Medication Sig   • albuterol (PROVENTIL HFA,VENTOLIN HFA) 90 mcg/act inhaler INHALE 2 PUFFS 20 MIN PRIOR TO EXERCISE AND UPTO EVERY 4 HOURS AS NEEDED   • clindamycin (CLEOCIN T) 1 % lotion apply thin layer to face twice a day   • spironolactone (ALDACTONE) 50 mg tablet TAKE ONE TABLET FOR THE FIRST 5 NIGHTS, THEN INCREASE TO TWO TABLETS NIGHTLY   • [DISCONTINUED] venlafaxine (EFFEXOR-XR) 75 mg 24 hr capsule Take 3 capsules (225 mg total) by mouth daily       Objective     /74 (BP Location: Left arm, Patient Position: Sitting, Cuff Size: Adult)   Pulse 98   Temp (!) 96.3 °F (35.7 °C) (Temporal)   Resp 16   Ht 5' 5.5" (1.664 m)   Wt 95 kg (209 lb 6.4 oz)   SpO2 99%   BMI 34.32 kg/m²     Physical Exam  Constitutional:       Appearance: She is well-developed. She is obese. HENT:      Head: Normocephalic and atraumatic. Right Ear: External ear normal.      Left Ear: External ear normal.      Nose: Nose normal.      Mouth/Throat:      Mouth: Mucous membranes are moist.   Eyes:      Conjunctiva/sclera: Conjunctivae normal.      Pupils: Pupils are equal, round, and reactive to light. Cardiovascular:      Rate and Rhythm: Normal rate and regular rhythm. Pulses: Normal pulses. Heart sounds: Normal heart sounds. Pulmonary:      Effort: Pulmonary effort is normal.      Breath sounds: Normal breath sounds. Abdominal:      General: Abdomen is flat. Bowel sounds are normal.      Palpations: Abdomen is soft. Musculoskeletal:         General: Normal range of motion. Cervical back: Normal range of motion and neck supple. Skin:     General: Skin is warm and dry. Capillary Refill: Capillary refill takes less than 2 seconds. Neurological:      General: No focal deficit present. Mental Status: She is alert and oriented to person, place, and time. Mental status is at baseline. Deep Tendon Reflexes: Reflexes are normal and symmetric.    Psychiatric:         Mood and Affect: Mood normal.         Behavior: Behavior normal.         Thought Content:  Thought content normal.         Judgment: Judgment normal.       Renee Fung, DO

## 2023-09-29 ENCOUNTER — APPOINTMENT (OUTPATIENT)
Dept: LAB | Facility: HOSPITAL | Age: 23
End: 2023-09-29
Payer: COMMERCIAL

## 2023-09-29 LAB
25(OH)D3 SERPL-MCNC: 39.8 NG/ML (ref 30–100)
ALBUMIN SERPL BCP-MCNC: 4.6 G/DL (ref 3.5–5)
ALP SERPL-CCNC: 53 U/L (ref 34–104)
ALT SERPL W P-5'-P-CCNC: 19 U/L (ref 7–52)
ANION GAP SERPL CALCULATED.3IONS-SCNC: 8 MMOL/L
AST SERPL W P-5'-P-CCNC: 18 U/L (ref 13–39)
BASOPHILS # BLD AUTO: 0.06 THOUSANDS/ÂΜL (ref 0–0.1)
BASOPHILS NFR BLD AUTO: 1 % (ref 0–1)
BILIRUB SERPL-MCNC: 0.43 MG/DL (ref 0.2–1)
BUN SERPL-MCNC: 15 MG/DL (ref 5–25)
CALCIUM SERPL-MCNC: 9.5 MG/DL (ref 8.4–10.2)
CHLORIDE SERPL-SCNC: 104 MMOL/L (ref 96–108)
CHOLEST SERPL-MCNC: 212 MG/DL
CO2 SERPL-SCNC: 25 MMOL/L (ref 21–32)
CREAT SERPL-MCNC: 0.97 MG/DL (ref 0.6–1.3)
EOSINOPHIL # BLD AUTO: 0.44 THOUSAND/ÂΜL (ref 0–0.61)
EOSINOPHIL NFR BLD AUTO: 7 % (ref 0–6)
ERYTHROCYTE [DISTWIDTH] IN BLOOD BY AUTOMATED COUNT: 12.3 % (ref 11.6–15.1)
GFR SERPL CREATININE-BSD FRML MDRD: 82 ML/MIN/1.73SQ M
GLUCOSE P FAST SERPL-MCNC: 81 MG/DL (ref 65–99)
HCT VFR BLD AUTO: 45.5 % (ref 34.8–46.1)
HDLC SERPL-MCNC: 69 MG/DL
HGB BLD-MCNC: 14.9 G/DL (ref 11.5–15.4)
IMM GRANULOCYTES # BLD AUTO: 0.02 THOUSAND/UL (ref 0–0.2)
IMM GRANULOCYTES NFR BLD AUTO: 0 % (ref 0–2)
LDLC SERPL CALC-MCNC: 129 MG/DL (ref 0–100)
LYMPHOCYTES # BLD AUTO: 2.28 THOUSANDS/ÂΜL (ref 0.6–4.47)
LYMPHOCYTES NFR BLD AUTO: 36 % (ref 14–44)
MCH RBC QN AUTO: 28.9 PG (ref 26.8–34.3)
MCHC RBC AUTO-ENTMCNC: 32.7 G/DL (ref 31.4–37.4)
MCV RBC AUTO: 88 FL (ref 82–98)
MONOCYTES # BLD AUTO: 0.59 THOUSAND/ÂΜL (ref 0.17–1.22)
MONOCYTES NFR BLD AUTO: 9 % (ref 4–12)
NEUTROPHILS # BLD AUTO: 2.9 THOUSANDS/ÂΜL (ref 1.85–7.62)
NEUTS SEG NFR BLD AUTO: 47 % (ref 43–75)
NRBC BLD AUTO-RTO: 0 /100 WBCS
PLATELET # BLD AUTO: 274 THOUSANDS/UL (ref 149–390)
PMV BLD AUTO: 9.8 FL (ref 8.9–12.7)
POTASSIUM SERPL-SCNC: 4 MMOL/L (ref 3.5–5.3)
PROT SERPL-MCNC: 7.5 G/DL (ref 6.4–8.4)
RBC # BLD AUTO: 5.16 MILLION/UL (ref 3.81–5.12)
SODIUM SERPL-SCNC: 137 MMOL/L (ref 135–147)
TRIGL SERPL-MCNC: 72 MG/DL
WBC # BLD AUTO: 6.29 THOUSAND/UL (ref 4.31–10.16)

## 2023-09-29 PROCEDURE — 80053 COMPREHEN METABOLIC PANEL: CPT

## 2023-09-29 PROCEDURE — 82306 VITAMIN D 25 HYDROXY: CPT

## 2023-09-29 PROCEDURE — 85025 COMPLETE CBC W/AUTO DIFF WBC: CPT

## 2023-09-29 PROCEDURE — 80061 LIPID PANEL: CPT
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
## 2024-03-16 DIAGNOSIS — F33.42 MAJOR DEPRESSIVE DISORDER, RECURRENT, IN FULL REMISSION (HCC): ICD-10-CM

## 2024-03-17 RX ORDER — VENLAFAXINE HYDROCHLORIDE 75 MG/1
225 CAPSULE, EXTENDED RELEASE ORAL DAILY
Qty: 90 CAPSULE | Refills: 5 | Status: SHIPPED | OUTPATIENT
Start: 2024-03-17

## 2024-03-25 ENCOUNTER — APPOINTMENT (OUTPATIENT)
Dept: LAB | Facility: CLINIC | Age: 24
End: 2024-03-25
Payer: COMMERCIAL

## 2024-03-25 DIAGNOSIS — Z01.812 PRE-OPERATIVE LABORATORY EXAMINATION: ICD-10-CM

## 2024-03-25 DIAGNOSIS — N62 HYPERTROPHY OF BREAST: ICD-10-CM

## 2024-03-25 DIAGNOSIS — Z01.818 OTHER SPECIFIED PRE-OPERATIVE EXAMINATION: ICD-10-CM

## 2024-03-25 LAB
ANION GAP SERPL CALCULATED.3IONS-SCNC: 7 MMOL/L (ref 4–13)
BUN SERPL-MCNC: 14 MG/DL (ref 5–25)
CALCIUM SERPL-MCNC: 9.5 MG/DL (ref 8.4–10.2)
CHLORIDE SERPL-SCNC: 104 MMOL/L (ref 96–108)
CO2 SERPL-SCNC: 26 MMOL/L (ref 21–32)
CREAT SERPL-MCNC: 0.87 MG/DL (ref 0.6–1.3)
ERYTHROCYTE [DISTWIDTH] IN BLOOD BY AUTOMATED COUNT: 12.4 % (ref 11.6–15.1)
GFR SERPL CREATININE-BSD FRML MDRD: 93 ML/MIN/1.73SQ M
GLUCOSE SERPL-MCNC: 78 MG/DL (ref 65–140)
HCT VFR BLD AUTO: 44.1 % (ref 34.8–46.1)
HGB BLD-MCNC: 14.5 G/DL (ref 11.5–15.4)
MCH RBC QN AUTO: 29.9 PG (ref 26.8–34.3)
MCHC RBC AUTO-ENTMCNC: 32.9 G/DL (ref 31.4–37.4)
MCV RBC AUTO: 91 FL (ref 82–98)
PLATELET # BLD AUTO: 262 THOUSANDS/UL (ref 149–390)
PMV BLD AUTO: 9.7 FL (ref 8.9–12.7)
POTASSIUM SERPL-SCNC: 3.7 MMOL/L (ref 3.5–5.3)
RBC # BLD AUTO: 4.85 MILLION/UL (ref 3.81–5.12)
SODIUM SERPL-SCNC: 137 MMOL/L (ref 135–147)
WBC # BLD AUTO: 6.35 THOUSAND/UL (ref 4.31–10.16)

## 2024-03-25 PROCEDURE — 80048 BASIC METABOLIC PNL TOTAL CA: CPT

## 2024-03-25 PROCEDURE — 85027 COMPLETE CBC AUTOMATED: CPT

## 2024-03-25 PROCEDURE — 36415 COLL VENOUS BLD VENIPUNCTURE: CPT

## 2024-03-26 ENCOUNTER — TELEPHONE (OUTPATIENT)
Dept: ADMINISTRATIVE | Facility: OTHER | Age: 24
End: 2024-03-26

## 2024-03-26 ENCOUNTER — RA CDI HCC (OUTPATIENT)
Dept: OTHER | Facility: HOSPITAL | Age: 24
End: 2024-03-26

## 2024-03-26 NOTE — TELEPHONE ENCOUNTER
----- Message from Leisa Reynoso sent at 3/26/2024  9:50 AM EDT -----  Regarding: Chlamydia  03/26/24 9:50 AM    Hello, our patient attached above has had Chlamydia completed/performed. Please assist in updating the patient chart by pulling the Care Everywhere (CE) document. The date of service is 4/29/2021.     Thank you,  LEISA REYNOSO  [facility]

## 2024-03-26 NOTE — TELEPHONE ENCOUNTER
Upon review of the In Basket request we were able to locate, review, and update the patient chart as requested for Chlamydia.    Any additional questions or concerns should be emailed to the Practice Liaisons via the appropriate education email address, please do not reply via In Basket.    Thank you  Nohemy Rodriguez

## 2024-03-27 ENCOUNTER — OFFICE VISIT (OUTPATIENT)
Dept: FAMILY MEDICINE CLINIC | Facility: CLINIC | Age: 24
End: 2024-03-27
Payer: COMMERCIAL

## 2024-03-27 VITALS
DIASTOLIC BLOOD PRESSURE: 82 MMHG | TEMPERATURE: 97.6 F | BODY MASS INDEX: 31.27 KG/M2 | HEIGHT: 66 IN | HEART RATE: 79 BPM | SYSTOLIC BLOOD PRESSURE: 120 MMHG | WEIGHT: 194.6 LBS | OXYGEN SATURATION: 97 %

## 2024-03-27 DIAGNOSIS — Z13.220 SCREENING FOR HYPERLIPIDEMIA: ICD-10-CM

## 2024-03-27 DIAGNOSIS — M65.4 DE QUERVAIN'S DISEASE (TENOSYNOVITIS): ICD-10-CM

## 2024-03-27 DIAGNOSIS — Z00.00 HEALTH CARE MAINTENANCE: ICD-10-CM

## 2024-03-27 DIAGNOSIS — F33.8 SEASONAL AFFECTIVE DISORDER (HCC): ICD-10-CM

## 2024-03-27 DIAGNOSIS — E55.9 VITAMIN D DEFICIENCY: ICD-10-CM

## 2024-03-27 DIAGNOSIS — F42.2 MIXED OBSESSIONAL THOUGHTS AND ACTS: ICD-10-CM

## 2024-03-27 DIAGNOSIS — F32.A DEPRESSION, UNSPECIFIED DEPRESSION TYPE: Primary | ICD-10-CM

## 2024-03-27 PROCEDURE — 99214 OFFICE O/P EST MOD 30 MIN: CPT | Performed by: FAMILY MEDICINE

## 2024-03-27 NOTE — PRE-PROCEDURE INSTRUCTIONS
Pre-Surgery Instructions:   Medication Instructions    clindamycin (CLEOCIN T) 1 % lotion Stop taking 1 day prior to surgery.    spironolactone (ALDACTONE) 50 mg tablet Take night before surgery    venlafaxine (EFFEXOR-XR) 75 mg 24 hr capsule Take day of surgery.   Medication instructions for day surgery reviewed. Please use only a sip of water to take your instructed medications. Avoid all over the counter vitamins, supplements and NSAIDS for one week prior to surgery per anesthesia guidelines. Tylenol is ok to take as needed.     You will receive a call one business day prior to surgery with an arrival time and hospital directions. If your surgery is scheduled on a Monday, the hospital will be calling you on the Friday prior to your surgery. If you have not heard from anyone by 8pm, please call the hospital supervisor through the hospital  at [phone]. ([place] [phone] or [place] [phone]).    Do not eat or drink anything after midnight the night before your surgery, including candy, mints, lifesavers, or chewing gum. Do not drink alcohol 24hrs before your surgery. Try not to smoke at least 24hrs before your surgery.       Follow the pre surgery showering instructions as listed in the “My Surgical Experience Booklet” or otherwise provided by your surgeon's office. Do not use a blade to shave the surgical area 1 week before surgery. It is okay to use a clean electric clippers up to 24 hours before surgery. Do not apply any lotions, creams, including makeup, cologne, deodorant, or perfumes after showering on the day of your surgery. Do not use dry shampoo, hair spray, hair gel, or any type of hair products.     No contact lenses, eye make-up, or artificial eyelashes. Remove nail polish, including gel polish, and any artificial, gel, or acrylic nails if possible. Remove all jewelry including rings and body piercing jewelry.     Wear causal clothing that is easy to take on and off. Consider your  type of surgery.    Keep any valuables, jewelry, piercings at home. Please bring any specially ordered equipment (sling, braces) if indicated.    Arrange for a responsible person to drive you to and from the hospital on the day of your surgery. Please confirm the visitor policy for the day of your procedure when you receive your phone call with an arrival time.     Call the surgeon's office with any new illnesses, exposures, or additional questions prior to surgery.    Please reference your “My Surgical Experience Booklet” for additional information to prepare for your upcoming surgery.

## 2024-03-27 NOTE — PATIENT INSTRUCTIONS
Depression and SAD and OCD stable and will continue Same Effexor XR dose and rec diet and exercise as directed to get BMI lower than 25. Rec also to see GYN as directed. Recheck in 6 months. Rec orthopedics consult for right De Quervain's tenosynovitis.

## 2024-03-27 NOTE — PROGRESS NOTES
Chief Complaint   Patient presents with    Follow-up     Follow up depression      Patient Instructions   Depression and SAD and OCD stable and will continue Same Effexor XR dose and rec diet and exercise as directed to get BMI lower than 25. Rec also to see GYN as directed. Recheck in 6 months. Rec orthopedics consult for right De Quervain's tenosynovitis.   Assessment/Plan:    No problem-specific Assessment & Plan notes found for this encounter.       Diagnoses and all orders for this visit:    Depression, unspecified depression type  -     Cancel: Comprehensive metabolic panel; Future  -     Cancel: CBC and differential; Future  -     CBC and differential; Future  -     Comprehensive metabolic panel; Future    Seasonal affective disorder (HCC)  Comments:  better  Orders:  -     Cancel: Comprehensive metabolic panel; Future  -     Cancel: CBC and differential; Future  -     CBC and differential; Future  -     Comprehensive metabolic panel; Future    Vitamin D deficiency  -     Cancel: Vitamin D 25 hydroxy; Future  -     Vitamin D 25 hydroxy; Future    Mixed obsessional thoughts and acts    De Quervain's disease (tenosynovitis)  -     Ambulatory Referral to Orthopedic Surgery; Future    Screening for hyperlipidemia  -     Cancel: Lipid Panel with Direct LDL reflex; Future  -     Lipid Panel with Direct LDL reflex; Future    Health care maintenance          Subjective:      Patient ID: Katia Wilhelm is a 24 y.o. female.    Here for echeck and doing well and is taking all meds as directed. Has right DeQuervains tenosynovitis, consult orthopedics. Rest right wrist.         The following portions of the patient's history were reviewed and updated as appropriate: allergies, current medications, past family history, past medical history, past social history, past surgical history, and problem list.    Review of Systems   Constitutional: Negative.    HENT: Negative.     Eyes: Negative.    Respiratory: Negative.    "  Cardiovascular: Negative.    Gastrointestinal: Negative.    Endocrine: Negative.    Genitourinary: Negative.    Musculoskeletal:         Dequervains tenosynovitis   Skin: Negative.    Allergic/Immunologic: Negative.    Neurological: Negative.    Hematological: Negative.    Psychiatric/Behavioral: Negative.           Objective:      /82   Pulse 79   Temp 97.6 °F (36.4 °C)   Ht 5' 5.5\" (1.664 m)   Wt 88.3 kg (194 lb 9.6 oz)   LMP 02/21/2024   SpO2 97%   BMI 31.89 kg/m²          Physical Exam  Constitutional:       Appearance: She is well-developed. She is obese.   HENT:      Head: Normocephalic and atraumatic.      Right Ear: External ear normal.      Left Ear: External ear normal.      Nose: Nose normal.   Eyes:      Conjunctiva/sclera: Conjunctivae normal.      Pupils: Pupils are equal, round, and reactive to light.   Cardiovascular:      Rate and Rhythm: Normal rate and regular rhythm.      Heart sounds: Normal heart sounds.   Pulmonary:      Effort: Pulmonary effort is normal.      Breath sounds: Normal breath sounds.   Musculoskeletal:      Cervical back: Normal range of motion and neck supple.      Comments: Right dequervains tenosynovitis   Skin:     General: Skin is warm and dry.      Capillary Refill: Capillary refill takes less than 2 seconds.   Neurological:      General: No focal deficit present.      Mental Status: She is alert and oriented to person, place, and time. Mental status is at baseline.      Deep Tendon Reflexes: Reflexes are normal and symmetric.   Psychiatric:         Mood and Affect: Mood normal.         Behavior: Behavior normal.         Thought Content: Thought content normal.         Judgment: Judgment normal.      Comments: Moods stable           "

## 2024-04-03 ENCOUNTER — HOSPITAL ENCOUNTER (OUTPATIENT)
Facility: HOSPITAL | Age: 24
Setting detail: OUTPATIENT SURGERY
Discharge: HOME | End: 2024-04-03
Attending: PLASTIC SURGERY | Admitting: PLASTIC SURGERY
Payer: COMMERCIAL

## 2024-04-03 ENCOUNTER — ANESTHESIA EVENT (OUTPATIENT)
Dept: PERIOP | Facility: HOSPITAL | Age: 24
End: 2024-04-03
Payer: COMMERCIAL

## 2024-04-03 ENCOUNTER — ANESTHESIA (OUTPATIENT)
Dept: PERIOP | Facility: HOSPITAL | Age: 24
End: 2024-04-03
Payer: COMMERCIAL

## 2024-04-03 VITALS
DIASTOLIC BLOOD PRESSURE: 62 MMHG | HEART RATE: 105 BPM | TEMPERATURE: 99 F | SYSTOLIC BLOOD PRESSURE: 122 MMHG | HEIGHT: 65 IN | BODY MASS INDEX: 31.65 KG/M2 | OXYGEN SATURATION: 95 % | WEIGHT: 190 LBS | RESPIRATION RATE: 16 BRPM

## 2024-04-03 DIAGNOSIS — N62 HYPERTROPHY OF BREAST: ICD-10-CM

## 2024-04-03 DIAGNOSIS — M54.89 OTHER BACK PAIN, UNSPECIFIED CHRONICITY: ICD-10-CM

## 2024-04-03 DIAGNOSIS — E88.1 LIPODYSTROPHY, NOT ELSEWHERE CLASSIFIED: ICD-10-CM

## 2024-04-03 LAB
EXT PREGNANCY TEST URINE: NEGATIVE
EXT. CONTROL: NORMAL

## 2024-04-03 PROCEDURE — 88305 TISSUE EXAM BY PATHOLOGIST: CPT | Performed by: PATHOLOGY

## 2024-04-03 PROCEDURE — 81025 URINE PREGNANCY TEST: CPT | Performed by: ANESTHESIOLOGY

## 2024-04-03 RX ORDER — CEFAZOLIN SODIUM 1 G/50ML
1000 SOLUTION INTRAVENOUS ONCE
Status: COMPLETED | OUTPATIENT
Start: 2024-04-03 | End: 2024-04-03

## 2024-04-03 RX ORDER — FENTANYL CITRATE 50 UG/ML
INJECTION, SOLUTION INTRAMUSCULAR; INTRAVENOUS AS NEEDED
Status: DISCONTINUED | OUTPATIENT
Start: 2024-04-03 | End: 2024-04-03

## 2024-04-03 RX ORDER — OXYCODONE HYDROCHLORIDE 5 MG/1
5 TABLET ORAL EVERY 4 HOURS PRN
Status: DISCONTINUED | OUTPATIENT
Start: 2024-04-03 | End: 2024-04-03 | Stop reason: HOSPADM

## 2024-04-03 RX ORDER — MINERAL OIL
OIL (ML) MISCELLANEOUS AS NEEDED
Status: DISCONTINUED | OUTPATIENT
Start: 2024-04-03 | End: 2024-04-03 | Stop reason: HOSPADM

## 2024-04-03 RX ORDER — PROPOFOL 10 MG/ML
INJECTION, EMULSION INTRAVENOUS AS NEEDED
Status: DISCONTINUED | OUTPATIENT
Start: 2024-04-03 | End: 2024-04-03

## 2024-04-03 RX ORDER — ONDANSETRON 2 MG/ML
4 INJECTION INTRAMUSCULAR; INTRAVENOUS EVERY 8 HOURS PRN
Status: CANCELLED | OUTPATIENT
Start: 2024-04-03

## 2024-04-03 RX ORDER — HYDROMORPHONE HCL/PF 1 MG/ML
0.5 SYRINGE (ML) INJECTION
Status: DISCONTINUED | OUTPATIENT
Start: 2024-04-03 | End: 2024-04-03 | Stop reason: HOSPADM

## 2024-04-03 RX ORDER — NEOSTIGMINE METHYLSULFATE 1 MG/ML
INJECTION INTRAVENOUS AS NEEDED
Status: DISCONTINUED | OUTPATIENT
Start: 2024-04-03 | End: 2024-04-03

## 2024-04-03 RX ORDER — GABAPENTIN 300 MG/1
300 CAPSULE ORAL ONCE
Status: COMPLETED | OUTPATIENT
Start: 2024-04-03 | End: 2024-04-03

## 2024-04-03 RX ORDER — HYDROMORPHONE HCL/PF 1 MG/ML
SYRINGE (ML) INJECTION AS NEEDED
Status: DISCONTINUED | OUTPATIENT
Start: 2024-04-03 | End: 2024-04-03

## 2024-04-03 RX ORDER — DEXAMETHASONE SODIUM PHOSPHATE 10 MG/ML
INJECTION, SOLUTION INTRAMUSCULAR; INTRAVENOUS AS NEEDED
Status: DISCONTINUED | OUTPATIENT
Start: 2024-04-03 | End: 2024-04-03

## 2024-04-03 RX ORDER — PROPOFOL 10 MG/ML
INJECTION, EMULSION INTRAVENOUS CONTINUOUS PRN
Status: DISCONTINUED | OUTPATIENT
Start: 2024-04-03 | End: 2024-04-03

## 2024-04-03 RX ORDER — BUPIVACAINE HYDROCHLORIDE 5 MG/ML
INJECTION, SOLUTION EPIDURAL; INTRACAUDAL AS NEEDED
Status: DISCONTINUED | OUTPATIENT
Start: 2024-04-03 | End: 2024-04-03 | Stop reason: HOSPADM

## 2024-04-03 RX ORDER — ONDANSETRON 4 MG/1
4 TABLET, ORALLY DISINTEGRATING ORAL ONCE
Status: COMPLETED | OUTPATIENT
Start: 2024-04-03 | End: 2024-04-03

## 2024-04-03 RX ORDER — ONDANSETRON 2 MG/ML
INJECTION INTRAMUSCULAR; INTRAVENOUS AS NEEDED
Status: DISCONTINUED | OUTPATIENT
Start: 2024-04-03 | End: 2024-04-03

## 2024-04-03 RX ORDER — SODIUM CHLORIDE, SODIUM LACTATE, POTASSIUM CHLORIDE, CALCIUM CHLORIDE 600; 310; 30; 20 MG/100ML; MG/100ML; MG/100ML; MG/100ML
125 INJECTION, SOLUTION INTRAVENOUS CONTINUOUS
Status: DISCONTINUED | OUTPATIENT
Start: 2024-04-03 | End: 2024-04-03 | Stop reason: HOSPADM

## 2024-04-03 RX ORDER — OXYCODONE HYDROCHLORIDE 10 MG/1
10 TABLET ORAL EVERY 4 HOURS PRN
Status: DISCONTINUED | OUTPATIENT
Start: 2024-04-03 | End: 2024-04-03 | Stop reason: HOSPADM

## 2024-04-03 RX ORDER — ACETAMINOPHEN 325 MG/1
650 TABLET ORAL EVERY 6 HOURS PRN
Status: DISCONTINUED | OUTPATIENT
Start: 2024-04-03 | End: 2024-04-03 | Stop reason: HOSPADM

## 2024-04-03 RX ORDER — LIDOCAINE HYDROCHLORIDE 10 MG/ML
INJECTION, SOLUTION EPIDURAL; INFILTRATION; INTRACAUDAL; PERINEURAL AS NEEDED
Status: DISCONTINUED | OUTPATIENT
Start: 2024-04-03 | End: 2024-04-03

## 2024-04-03 RX ORDER — MAGNESIUM HYDROXIDE 1200 MG/15ML
LIQUID ORAL AS NEEDED
Status: DISCONTINUED | OUTPATIENT
Start: 2024-04-03 | End: 2024-04-03 | Stop reason: HOSPADM

## 2024-04-03 RX ORDER — ROCURONIUM BROMIDE 10 MG/ML
INJECTION, SOLUTION INTRAVENOUS AS NEEDED
Status: DISCONTINUED | OUTPATIENT
Start: 2024-04-03 | End: 2024-04-03

## 2024-04-03 RX ORDER — KETAMINE HCL IN NACL, ISO-OSM 100MG/10ML
SYRINGE (ML) INJECTION AS NEEDED
Status: DISCONTINUED | OUTPATIENT
Start: 2024-04-03 | End: 2024-04-03

## 2024-04-03 RX ORDER — FENTANYL CITRATE/PF 50 MCG/ML
50 SYRINGE (ML) INJECTION
Status: DISCONTINUED | OUTPATIENT
Start: 2024-04-03 | End: 2024-04-03 | Stop reason: HOSPADM

## 2024-04-03 RX ORDER — GLYCOPYRROLATE 0.2 MG/ML
INJECTION INTRAMUSCULAR; INTRAVENOUS AS NEEDED
Status: DISCONTINUED | OUTPATIENT
Start: 2024-04-03 | End: 2024-04-03

## 2024-04-03 RX ORDER — ACETAMINOPHEN 325 MG/1
650 TABLET ORAL ONCE
Status: COMPLETED | OUTPATIENT
Start: 2024-04-03 | End: 2024-04-03

## 2024-04-03 RX ADMIN — SODIUM CHLORIDE, SODIUM LACTATE, POTASSIUM CHLORIDE, AND CALCIUM CHLORIDE 125 ML/HR: .6; .31; .03; .02 INJECTION, SOLUTION INTRAVENOUS at 06:32

## 2024-04-03 RX ADMIN — OXYCODONE HYDROCHLORIDE 5 MG: 5 TABLET ORAL at 13:16

## 2024-04-03 RX ADMIN — HYDROMORPHONE HYDROCHLORIDE 0.5 MG: 1 INJECTION, SOLUTION INTRAMUSCULAR; INTRAVENOUS; SUBCUTANEOUS at 08:42

## 2024-04-03 RX ADMIN — GABAPENTIN 300 MG: 300 CAPSULE ORAL at 06:31

## 2024-04-03 RX ADMIN — NEOSTIGMINE METHYLSULFATE 3 MG: 1 INJECTION INTRAVENOUS at 11:56

## 2024-04-03 RX ADMIN — PROPOFOL 100 MCG/KG/MIN: 10 INJECTION, EMULSION INTRAVENOUS at 07:43

## 2024-04-03 RX ADMIN — ROCURONIUM BROMIDE 50 MG: 10 INJECTION, SOLUTION INTRAVENOUS at 07:43

## 2024-04-03 RX ADMIN — Medication 30 MG: at 07:43

## 2024-04-03 RX ADMIN — PROPOFOL 200 MG: 10 INJECTION, EMULSION INTRAVENOUS at 07:43

## 2024-04-03 RX ADMIN — FENTANYL CITRATE 50 MCG: 50 INJECTION, SOLUTION INTRAMUSCULAR; INTRAVENOUS at 12:46

## 2024-04-03 RX ADMIN — ONDANSETRON 4 MG: 4 TABLET, ORALLY DISINTEGRATING ORAL at 06:31

## 2024-04-03 RX ADMIN — CEFAZOLIN SODIUM 2000 MG: 1 SOLUTION INTRAVENOUS at 11:34

## 2024-04-03 RX ADMIN — DEXAMETHASONE SODIUM PHOSPHATE 10 MG: 10 INJECTION, SOLUTION INTRAMUSCULAR; INTRAVENOUS at 07:46

## 2024-04-03 RX ADMIN — FENTANYL CITRATE 100 MCG: 50 INJECTION, SOLUTION INTRAMUSCULAR; INTRAVENOUS at 07:42

## 2024-04-03 RX ADMIN — ACETAMINOPHEN 650 MG: 325 TABLET ORAL at 06:30

## 2024-04-03 RX ADMIN — CEFAZOLIN SODIUM 2000 MG: 1 SOLUTION INTRAVENOUS at 07:35

## 2024-04-03 RX ADMIN — LIDOCAINE HYDROCHLORIDE 50 MG: 10 INJECTION, SOLUTION EPIDURAL; INFILTRATION; INTRACAUDAL; PERINEURAL at 07:42

## 2024-04-03 RX ADMIN — GLYCOPYRROLATE 0.4 MG: 0.2 INJECTION, SOLUTION INTRAMUSCULAR; INTRAVENOUS at 11:56

## 2024-04-03 RX ADMIN — ONDANSETRON 4 MG: 2 INJECTION INTRAMUSCULAR; INTRAVENOUS at 11:43

## 2024-04-03 NOTE — DISCHARGE SUMMARY
Discharge Summary - Medical Katia Wilhelm 24 y.o. female MRN: [id number]    [facility] Room / Bed: OR POOL/OR POOL Encounter: [id number]    BRIEF OVERVIEW  Admitting Provider: Brea England MD  Discharge Provider: Brea England MD  Primary Care Physician at Discharge: Tory Reyes [phone]    Discharge To:  Home      Admission Date: 4/3/2024     Discharge Date: No discharge date for patient encounter.    Code Status: No Order  Advance Directive and Living Will: <no information>  Power of :        Primary Discharge Diagnosis  Active Problems:    * No active hospital problems. *  Resolved Problems:    * No resolved hospital problems. *        Discharge Disposition: Home/Self Care            DETAILS OF HOSPITAL STAY    Presenting Problem/History of Present Illness  <principal problem not specified>      Discharge Condition: stable    Patient tolerated the procedure well, recovered and was discharged home in stable condition    Brea England MD  4/3/2024  7:35 AM

## 2024-04-03 NOTE — DISCHARGE INSTR - AVS FIRST PAGE
[facility]     [facility], [address] [place] [address], [address], [place] [address]   P [phone]/F [phone]/ [email or web address]       No heavy lifting >20 pounds    Do not raise hands above head    No ice or heating pack to chest    Wear the surgical bra at all times except the shower. If the bra is tight and is leaving marks on the skin unclasp the bottom clasps of the bra    Ok to shower    No bathing    Change dressing daily    Do not lay on stomach    No smoking    No pushing or pulling    Call the office for an appointment in 5-14 days - [phone]

## 2024-04-03 NOTE — INTERVAL H&P NOTE
H&P reviewed. After examining the patient I find no changes in the patients condition since the H&P had been written.    Vitals:    04/03/24 0623   BP: 121/63   Pulse: 85   Resp: 18   Temp: 97.8 °F (36.6 °C)   SpO2: 97%

## 2024-04-03 NOTE — OP NOTE
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 The patient was brought to the operating room and placed supine on the operating table. A time-out procedure was performed. Sequential compression devices were applied. IV antibiotics were given. After adequate anesthesia was obtained, the patient was turned into a prone position with all appropriate pressure precautions taken.  The back was prepped and draped using standard surgical technique.  Stab incisions were made in the back and tumescent anesthesia was infiltrated in the subcutaneous plane for the back.  After adequate time for hemostatic effect liposuction was performed in a subcutaneous plane for the back.  Clear yellow Lipo aspirate was obtained. 1600ml of fat was aspirated.  An excellent contour was obtained.  The stab incisions were closed using 4-0 PDS suture in interrupted deep dermal technique followed by skin glue.  The patient was then turned to a supine position.    The  chest was prepped and draped using standard surgical    technique.      Attention was turned to the right breast. The patient had been marked with Wise pattern inferior pedicle technique with 8 cm vertical limbs. An 8 cm wide inferior-based pedicle was designed at the breast meridian. The pedicle was de-epithelialized after designing    a 38 mm nipple areolar cut out. The medial and lateral borders of the pedicle were dissected down to just superficial to the pectoral fascia. The medial and lateral triangles were excised. Following this, the superior border of the pedicle was divided down to the muscular fascia. At this point, the pedicle was examined. Excellent vascularity was noted at the distal aspect. At this point the decision was made to excise this superior portion of the Higgins pattern. After this tumescent anesthesia was infiltrated into the lateral breast. Direct trimming of the superior breast flap was performed using a curved Shea scissor to obtain healthy flap thickness. At this point liposuction using a 3 Beninese Mercedes style cannula was performed of the lateral breast for a total of 100mL. The direct excision of breast tissue was 699 grams. At this point, the wounds were copiously irrigated. Excellent hemostasis was achieved using electrocautery. The inferior pedicle was tacked to the pectoral fascia using 2-0 Vicryl suture. The vertical limbs were secured to the inframammary fold using 0 Prolene suture in half buried mattress technique. The skin was closed using 2-0 Vicryl, 3-0 Vicryl and 3-0 PDS suture in interrupted deep dermal technique.  There was excellent vascularity of the nipple    at the end of the closure.     Attention was turned to the left breast. The patient had been marked with Wise pattern inferior pedicle technique with 8 cm vertical limbs. An 8 cm wide inferior-based pedicle was designed at the breast meridian. The pedicle was de-epithelialized after designing    a 38 mm nipple  areolar cut out. The medial and lateral borders of the pedicle were dissected down to just superficial to the pectoral fascia. The medial and lateral triangles were excised. Following this, the superior border of the pedicle was divided down to the muscular fascia. At this point, the pedicle was examined. Excellent vascularity was noted at the distal aspect. At this point the decision was made to excise this superior portion of the Higgins pattern. After this tumescent anesthesia was infiltrated into the lateral breast. Direct trimming of the superior breast flap was performed using a curved Shea scissor to obtain healthy flap thickness. At this point liposuction using a 3 Greek Mercedes style cannula was performed of the lateral breast for a total of 100mL. The direct excision of breast tissue was 731 grams. At this point, the wounds were copiously irrigated. Excellent hemostasis was achieved using electrocautery. The inferior pedicle was tacked to the pectoral fascia using 2-0 Vicryl suture. The vertical limbs were secured to the inframammary fold using 0 Prolene suture in half buried mattress technique. The skin was closed using 2-0 Vicryl, 3-0 Vicryl and 3-0 PDS suture in interrupted deep dermal technique. There was excellent vascularity of the nipple    at the end of the closure.          There was excellent symmetry between both breasts. All the wounds were cleaned and dried.  The superficial skin of both breasts was closed using 3-0 monocryl suture in running subcuticular technique.    All the    wounds were cleaned and dried. Skin glue, ABD dressings, a garment were applied. The patient tolerated the procedure well, was    extubated in the operating room and taken to the recovery room in    stable condition.        I was present for the entire procedure.    Patient Disposition:  extubated and stable        SIGNATURE: Brea England MD  DATE: April 3, 2024  TIME: 7:28 AM

## 2024-04-03 NOTE — ANESTHESIA POSTPROCEDURE EVALUATION
Post-Op Assessment Note    CV Status:  Stable  Pain Score: 0    Pain management: adequate       Mental Status:  Alert   Hydration Status:  Stable   PONV Controlled:  Controlled   Airway Patency:  Patent     Post Op Vitals Reviewed: Yes    No anethesia notable event occurred.    Staff: CRNA               /63 (04/03/24 1211)    Temp 99 °F (37.2 °C) (04/03/24 1211)    Pulse 101 (04/03/24 1211)   Resp 18 (04/03/24 1211)    SpO2 94 % (04/03/24 1211)

## 2024-04-03 NOTE — NURSING NOTE
Pt ambulated and voided. Pt in no apparent distress; vs stable. AVS reviewed; pt verbalized understanding. IV removed.

## 2024-04-03 NOTE — ANESTHESIA PREPROCEDURE EVALUATION
Procedure:  BREAST REDUCTION (Bilateral: Breast)  LIPOSUCTION OF UPPER BACK (Back)    Relevant Problems   NEURO/PSYCH   (+) Chronic nonintractable headache   (+) Seasonal affective disorder (HCC)      Behavioral Health   (+) Obsessive compulsive disorder      Orthopedic/Musculoskeletal   (+) Kyphosis      Dermatology   (+) Hyperhidrosis      Other   (+) Obesity (BMI 30-39.9)   (+) Vitamin D deficiency        Physical Exam    Airway    Mallampati score: II  TM Distance: >3 FB  Neck ROM: full     Dental   No notable dental hx     Cardiovascular  Cardiovascular exam normal    Pulmonary  Pulmonary exam normal     Other Findings  post-pubertal.      Anesthesia Plan  ASA Score- 2     Anesthesia Type- general with ASA Monitors.         Additional Monitors:     Airway Plan: ETT.           Plan Factors-Exercise tolerance (METS): >4 METS.    Chart reviewed.   Existing labs reviewed.     Patient is not a current smoker. Patient not instructed to abstain from smoking on day of procedure. Patient did not smoke on day of surgery.            Induction- intravenous.    Postoperative Plan-     Informed Consent- Anesthetic plan and risks discussed with patient.  I personally reviewed this patient with the CRNA. Discussed and agreed on the Anesthesia Plan with the CRNA..

## 2024-04-08 PROCEDURE — 88305 TISSUE EXAM BY PATHOLOGIST: CPT | Performed by: PATHOLOGY

## 2024-04-29 ENCOUNTER — OFFICE VISIT (OUTPATIENT)
Dept: OBGYN CLINIC | Facility: CLINIC | Age: 24
End: 2024-04-29
Payer: COMMERCIAL

## 2024-04-29 VITALS
DIASTOLIC BLOOD PRESSURE: 63 MMHG | SYSTOLIC BLOOD PRESSURE: 98 MMHG | HEIGHT: 65 IN | WEIGHT: 190 LBS | BODY MASS INDEX: 31.65 KG/M2

## 2024-04-29 DIAGNOSIS — M65.4 DE QUERVAIN'S DISEASE (TENOSYNOVITIS): ICD-10-CM

## 2024-04-29 DIAGNOSIS — M65.4 TENDINITIS, DE QUERVAIN'S: Primary | ICD-10-CM

## 2024-04-29 PROCEDURE — 99203 OFFICE O/P NEW LOW 30 MIN: CPT | Performed by: SURGERY

## 2024-04-29 PROCEDURE — 20550 NJX 1 TENDON SHEATH/LIGAMENT: CPT | Performed by: SURGERY

## 2024-04-29 RX ORDER — LIDOCAINE HYDROCHLORIDE 10 MG/ML
3 INJECTION, SOLUTION INFILTRATION; PERINEURAL
Status: COMPLETED | OUTPATIENT
Start: 2024-04-29 | End: 2024-04-29

## 2024-04-29 RX ORDER — BETAMETHASONE SODIUM PHOSPHATE AND BETAMETHASONE ACETATE 3; 3 MG/ML; MG/ML
6 INJECTION, SUSPENSION INTRA-ARTICULAR; INTRALESIONAL; INTRAMUSCULAR; SOFT TISSUE
Status: COMPLETED | OUTPATIENT
Start: 2024-04-29 | End: 2024-04-29

## 2024-04-29 RX ADMIN — BETAMETHASONE SODIUM PHOSPHATE AND BETAMETHASONE ACETATE 6 MG: 3; 3 INJECTION, SUSPENSION INTRA-ARTICULAR; INTRALESIONAL; INTRAMUSCULAR; SOFT TISSUE at 16:00

## 2024-04-29 RX ADMIN — LIDOCAINE HYDROCHLORIDE 3 ML: 10 INJECTION, SOLUTION INFILTRATION; PERINEURAL at 16:00

## 2024-04-29 NOTE — PROGRESS NOTES
ORTHOPAEDIC HAND, WRIST, AND ELBOW OFFICE  VISIT       ASSESSMENT/PLAN:      24 y.o. year old female who presents with Right De Quervain's tendinitis    Physical exam performed consistent with Elodia's  Discussed splinting at night for 6 weeks or an injection can be provided. She can also do the injection and start splinting   Pt was offered, accepted, performed and steroid injection Rigth 1st Dorsal compartment for symptomatic relief. Pt tolerated injections well. Ice and post injection protocol advised. Weigh bearing activities as tolerated.  She will start splinting at night as well  OTC pain medication as needed    The patient verbalized understanding of exam findings and treatment plan. We engaged in the shared decision-making process and treatment options were discussed at length with the patient. Surgical and conservative management discussed today along with risks and benefits.    Diagnoses and all orders for this visit:    Tendinitis, de Quervain's  -     Hand/upper extremity injection: R extensor compartment 1    De Quervain's disease (tenosynovitis)  -     Ambulatory Referral to Orthopedic Surgery        Follow Up:  Return if symptoms worsen or fail to improve.          ____________________________________________________________________________________________________________________________________________      CHIEF COMPLAINT:  Chief Complaint   Patient presents with    Right Wrist - Pain       SUBJECTIVE:  Katia Wilhelm is a 24 y.o. year old  female who presents Right wrist pain      Patient states that she has been having Right dorsal wrist pain for 3 months now with no known injury  She states she has pain with certain wrist motions at work. She works in a lab. She has been using a OTC brace at times for the past two months but cannot wear it at work. She has not been using it at night. No other treatments  Her pain has improved at she has been off work for 1 month now. She denies nay  numbness          I have personally reviewed all the relevant PMH, PSH, SH, FH, Medications and allergies      PAST MEDICAL HISTORY:  Past Medical History:   Diagnosis Date    Allergy     last assessed: 12/4/2013    Anxiety     Depression     Dysuria     last assessed: 3/5/2014    Kyphosis, acquired     last assessed: 1/17/2018    Murmur     last assessed: 6/24/2014    Nicotine use disorder 7/12/2021       PAST SURGICAL HISTORY:  Past Surgical History:   Procedure Laterality Date    NJ BREAST REDUCTION Bilateral 4/3/2024    Procedure: BREAST REDUCTION;  Surgeon: Brea England MD;  Location:  MAIN OR;  Service: Plastics    NJ SUCTION ASSISTED LIPECTOMY TRUNK N/A 4/3/2024    Procedure: LIPOSUCTION OF UPPER BACK;  Surgeon: Brea England MD;  Location:  MAIN OR;  Service: Plastics    WISDOM TOOTH EXTRACTION         FAMILY HISTORY:  Family History   Problem Relation Age of Onset    Depression Father     Heart disease Maternal Grandmother     Heart disease Paternal Grandfather        SOCIAL HISTORY:  Social History     Tobacco Use    Smoking status: Never    Smokeless tobacco: Never   Vaping Use    Vaping status: Never Used   Substance Use Topics    Alcohol use: Yes     Alcohol/week: 1.0 standard drink of alcohol     Types: 1 Standard drinks or equivalent per week     Comment: socially    Drug use: No       MEDICATIONS:    Current Outpatient Medications:     clindamycin (CLEOCIN T) 1 % lotion, apply thin layer to face twice a day, Disp: , Rfl:     spironolactone (ALDACTONE) 50 mg tablet, TAKE ONE TABLET FOR THE FIRST 5 NIGHTS, THEN INCREASE TO TWO TABLETS NIGHTLY, Disp: , Rfl:     venlafaxine (EFFEXOR-XR) 75 mg 24 hr capsule, TAKE 3 CAPSULES BY MOUTH DAILY., Disp: 90 capsule, Rfl: 5    ALLERGIES:  Allergies   Allergen Reactions    Pineapple - Food Allergy      Other reaction(s): Tounge and throat swell    Sulfa Antibiotics Rash and Hives           REVIEW OF SYSTEMS:  Review of Systems   Constitutional:  Negative  for chills and fever.   HENT:  Negative for ear pain and sore throat.    Eyes:  Negative for pain and visual disturbance.   Respiratory:  Negative for cough and shortness of breath.    Cardiovascular:  Negative for chest pain and palpitations.   Gastrointestinal:  Negative for abdominal pain and vomiting.   Genitourinary:  Negative for dysuria and hematuria.   Musculoskeletal:  Negative for arthralgias and back pain.   Skin:  Negative for color change and rash.   Neurological:  Negative for seizures and syncope.   All other systems reviewed and are negative.      VITALS:  Vitals:    04/29/24 1556   BP: 98/63       LABS:  HgA1c:   Lab Results   Component Value Date    HGBA1C 4.7 06/17/2022     BMP:   Lab Results   Component Value Date    CALCIUM 9.5 03/25/2024    K 3.7 03/25/2024    CO2 26 03/25/2024     03/25/2024    BUN 14 03/25/2024    CREATININE 0.87 03/25/2024       _____________________________________________________  PHYSICAL EXAMINATION:  General: well developed and well nourished, alert, oriented times 3, and appears comfortable  Psychiatric: Normal  HEENT: Normocephalic, Atraumatic Trachea Midline, No torticollis  Pulmonary: No audible wheezing or respiratory distress   Abdomen/GI: Non tender, non distended   Cardiovascular: No pitting edema, 2+ radial pulse   Skin: No masses, erythema, lacerations, fluctation, ulcerations  Neurovascular: Sensation Intact to the Median, Ulnar, Radial Nerve, Motor Intact to the Median, Ulnar, Radial Nerve, and Pulses Intact  Musculoskeletal: Normal, except as noted in detailed exam and in HPI.      MUSCULOSKELETAL EXAMINATION:  Right hand:  SILT  Composite fist    Tender at 1st dorsal  Positive Finkelstein    Left hand:    ___________________________________________________  STUDIES REVIEWED:  No images obtained    PROCEDURES PERFORMED:  Hand/upper extremity injection: R extensor compartment 1  Universal Protocol:  Consent: Verbal consent obtained.  Risks and  benefits: risks, benefits and alternatives were discussed  Consent given by: patient  Timeout called at: 4/29/2024 4:15 PM.  Patient understanding: patient states understanding of the procedure being performed  Site marked: the operative site was marked  Patient identity confirmed: verbally with patient  Supporting Documentation  Indications: pain and tendon swelling   Procedure Details  Condition:de Quervain's tenosynovitis Site: R extensor compartment 1   Needle size: 22 G  Ultrasound guidance: no  Approach: radial  Medications administered: 3 mL lidocaine 1 %; 6 mg betamethasone acetate-betamethasone sodium phosphate 6 (3-3) mg/mL  Patient tolerance: patient tolerated the procedure well with no immediate complications  Dressing:  Sterile dressing applied             _____________________________________________________      Scribe Attestation      I,:  Reza Miranda am acting as a scribe while in the presence of the attending physician.:       I,:  Saravanan Hanley MD personally performed the services described in this documentation    as scribed in my presence.:

## 2024-05-16 ENCOUNTER — TELEPHONE (OUTPATIENT)
Age: 24
End: 2024-05-16

## 2024-05-16 DIAGNOSIS — L25.9 CONTACT DERMATITIS, UNSPECIFIED CONTACT DERMATITIS TYPE, UNSPECIFIED TRIGGER: Primary | ICD-10-CM

## 2024-05-16 RX ORDER — PREDNISONE 10 MG/1
10 TABLET ORAL DAILY
Qty: 5 TABLET | Refills: 0 | Status: SHIPPED | OUTPATIENT
Start: 2024-05-16 | End: 2024-05-21

## 2024-05-16 NOTE — TELEPHONE ENCOUNTER
I called and left the patient information on VM advised she give the office a call back if she would like steroid pack sent in.

## 2024-05-16 NOTE — TELEPHONE ENCOUNTER
Patient called in stating she is ok to take Prednisone kindly call in the prescription to her regular pharmacy and keep her informed on the same. Thanks

## 2024-05-16 NOTE — TELEPHONE ENCOUNTER
Patient called, states she messaged  through [employer]. Patient states she has Poison Ivy and request suggestions. Upon chart review/messages, confirmed patients [employer] messages and attachment. Please advise Patient at [phone], if any further questions.

## 2024-05-16 NOTE — TELEPHONE ENCOUNTER
She has had this poison for about 1 week and it is not really healing well. i am worried it is spreading down my chest and near my incision scars from my breast reduction. Now it is really dry but seems to still be spreading. I was wondering if I could have something to help this

## 2024-05-22 ENCOUNTER — VBI (OUTPATIENT)
Dept: ADMINISTRATIVE | Facility: OTHER | Age: 24
End: 2024-05-22

## 2024-06-20 ENCOUNTER — VBI (OUTPATIENT)
Dept: ADMINISTRATIVE | Facility: OTHER | Age: 24
End: 2024-06-20

## 2024-06-20 NOTE — TELEPHONE ENCOUNTER
06/20/24 3:44 PM     Chart reviewed for Pap Smear (HPV) aka Cervical Cancer Screening was/were not submitted to the patient's insurance.     Radha Andrews   [facility]

## 2024-09-12 DIAGNOSIS — F33.42 MAJOR DEPRESSIVE DISORDER, RECURRENT, IN FULL REMISSION (HCC): ICD-10-CM

## 2024-09-12 RX ORDER — VENLAFAXINE HYDROCHLORIDE 75 MG/1
225 CAPSULE, EXTENDED RELEASE ORAL DAILY
Qty: 90 CAPSULE | Refills: 5 | Status: SHIPPED | OUTPATIENT
Start: 2024-09-12

## 2024-10-08 ENCOUNTER — OFFICE VISIT (OUTPATIENT)
Dept: FAMILY MEDICINE CLINIC | Facility: CLINIC | Age: 24
End: 2024-10-08
Payer: COMMERCIAL

## 2024-10-08 VITALS
HEART RATE: 92 BPM | WEIGHT: 200.2 LBS | HEIGHT: 65 IN | SYSTOLIC BLOOD PRESSURE: 102 MMHG | TEMPERATURE: 97.7 F | OXYGEN SATURATION: 99 % | BODY MASS INDEX: 33.36 KG/M2 | DIASTOLIC BLOOD PRESSURE: 74 MMHG

## 2024-10-08 DIAGNOSIS — E55.9 VITAMIN D DEFICIENCY: ICD-10-CM

## 2024-10-08 DIAGNOSIS — Z23 INFLUENZA VACCINE NEEDED: ICD-10-CM

## 2024-10-08 DIAGNOSIS — E78.00 ELEVATED LDL CHOLESTEROL LEVEL: ICD-10-CM

## 2024-10-08 DIAGNOSIS — F33.8 SEASONAL AFFECTIVE DISORDER (HCC): ICD-10-CM

## 2024-10-08 DIAGNOSIS — Z00.00 HEALTH CARE MAINTENANCE: Primary | ICD-10-CM

## 2024-10-08 DIAGNOSIS — E66.9 OBESITY (BMI 30-39.9): ICD-10-CM

## 2024-10-08 PROCEDURE — 90656 IIV3 VACC NO PRSV 0.5 ML IM: CPT

## 2024-10-08 PROCEDURE — 90471 IMMUNIZATION ADMIN: CPT

## 2024-10-08 PROCEDURE — 99395 PREV VISIT EST AGE 18-39: CPT | Performed by: FAMILY MEDICINE

## 2024-10-08 NOTE — PROGRESS NOTES
Ambulatory Visit  Name: Katia Wilhelm      : 2000      MRN: [id number]  Encounter Provider: Tory Reyes DO  Encounter Date: 10/8/2024   Encounter department: [facility] PRIMARY CARE  Chief Complaint   Patient presents with    Physical Exam     Patient Instructions   Here for general PE and flu shot given today, get updated labs and see Gyn as directed. Lose weight to get BMI lower than 25. Use sunscreen and monitor for ticks and rec taking effexor XR for SAD and also rec to use meds for acne as per dermatology.     Assessment & Plan  Health care maintenance         Seasonal affective disorder (HCC)           Vitamin D deficiency         Obesity (BMI 30-39.9)  mild         Elevated LDL cholesterol level         [unfilled]  History of Present Illness     Here for general PE and is getting a masters and is a . Single and no children and does not exercise and tries to eat healthy. Sleeps 7-8 hours sleep per night. Uses sunscreen and monitors for ticks. Acne stable and takes effexor XR and is stable and is helping. Just got out of a relationship. Needs to get updated labs.         History obtained from : patient  Review of Systems   Constitutional: Negative.    HENT: Negative.     Eyes: Negative.    Respiratory: Negative.     Cardiovascular: Negative.    Gastrointestinal: Negative.    Endocrine: Negative.    Genitourinary: Negative.    Musculoskeletal: Negative.    Skin: Negative.    Allergic/Immunologic: Negative.    Neurological: Negative.    Hematological: Negative.    Psychiatric/Behavioral: Negative.       Current Outpatient Medications on File Prior to Visit   Medication Sig Dispense Refill    clindamycin (CLEOCIN T) 1 % lotion apply thin layer to face twice a day      spironolactone (ALDACTONE) 50 mg tablet TAKE ONE TABLET FOR THE FIRST 5 NIGHTS, THEN INCREASE TO TWO TABLETS NIGHTLY      venlafaxine (EFFEXOR-XR) 75 mg 24 hr capsule TAKE 3 CAPSULES BY MOUTH DAILY.  "90 capsule 5     No current facility-administered medications on file prior to visit.          Objective     /74   Pulse 92   Temp 97.7 °F (36.5 °C) (Temporal)   Ht 5' 4.5\" (1.638 m)   Wt 90.8 kg (200 lb 3.2 oz)   SpO2 99%   BMI 33.83 kg/m²     Physical Exam  Constitutional:       Appearance: She is well-developed. She is obese.   HENT:      Head: Normocephalic and atraumatic.      Right Ear: Tympanic membrane, ear canal and external ear normal.      Left Ear: Tympanic membrane, ear canal and external ear normal.      Nose: Nose normal.      Mouth/Throat:      Mouth: Mucous membranes are moist.   Eyes:      Conjunctiva/sclera: Conjunctivae normal.      Pupils: Pupils are equal, round, and reactive to light.   Cardiovascular:      Rate and Rhythm: Normal rate and regular rhythm.      Pulses: Normal pulses.      Heart sounds: Normal heart sounds.   Pulmonary:      Effort: Pulmonary effort is normal.      Breath sounds: Normal breath sounds.   Abdominal:      General: Abdomen is flat. Bowel sounds are normal.      Palpations: Abdomen is soft.   Musculoskeletal:         General: Normal range of motion.      Cervical back: Normal range of motion and neck supple.   Skin:     General: Skin is warm and dry.      Capillary Refill: Capillary refill takes less than 2 seconds.   Neurological:      General: No focal deficit present.      Mental Status: She is alert and oriented to person, place, and time. Mental status is at baseline.      Deep Tendon Reflexes: Reflexes are normal and symmetric.   Psychiatric:         Mood and Affect: Mood normal.         Behavior: Behavior normal.         Thought Content: Thought content normal.         Judgment: Judgment normal.       Administrative Statements   I have spent a total time of 30 minutes in caring for this patient on the day of the visit/encounter including Diagnostic results, Prognosis, Risks and benefits of tx options, Instructions for management, Patient and family " education, Importance of tx compliance, Risk factor reductions, Impressions, Counseling / Coordination of care, Documenting in the medical record, Reviewing / ordering tests, medicine, procedures  , and Obtaining or reviewing history  .

## 2024-10-08 NOTE — PATIENT INSTRUCTIONS
Here for general PE and flu shot given today, get updated labs and see Gyn as directed. Lose weight to get BMI lower than 25. Use sunscreen and monitor for ticks and rec taking effexor XR for SAD and also rec to use meds for acne as per dermatology.

## 2024-10-29 ENCOUNTER — OFFICE VISIT (OUTPATIENT)
Age: 24
End: 2024-10-29
Payer: COMMERCIAL

## 2024-10-29 VITALS
WEIGHT: 201.2 LBS | BODY MASS INDEX: 33.52 KG/M2 | DIASTOLIC BLOOD PRESSURE: 76 MMHG | HEIGHT: 65 IN | SYSTOLIC BLOOD PRESSURE: 102 MMHG

## 2024-10-29 DIAGNOSIS — Z01.419 ENCOUNTER FOR ANNUAL ROUTINE GYNECOLOGICAL EXAMINATION: Primary | ICD-10-CM

## 2024-10-29 DIAGNOSIS — Z51.81 ENCOUNTER FOR MEDICATION MONITORING: ICD-10-CM

## 2024-10-29 DIAGNOSIS — Z11.3 SCREENING FOR STD (SEXUALLY TRANSMITTED DISEASE): ICD-10-CM

## 2024-10-29 DIAGNOSIS — Z30.011 INITIATION OF ORAL CONTRACEPTION: ICD-10-CM
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
## 2024-10-29 DIAGNOSIS — Z12.4 SCREENING FOR CERVICAL CANCER: ICD-10-CM

## 2024-10-29 PROCEDURE — S0612 ANNUAL GYNECOLOGICAL EXAMINA: HCPCS | Performed by: OBSTETRICS & GYNECOLOGY

## 2024-10-29 PROCEDURE — G0145 SCR C/V CYTO,THINLAYER,RESCR: HCPCS | Performed by: OBSTETRICS & GYNECOLOGY

## 2024-10-29 PROCEDURE — 87491 CHLMYD TRACH DNA AMP PROBE: CPT | Performed by: OBSTETRICS & GYNECOLOGY

## 2024-10-29 PROCEDURE — 87591 N.GONORRHOEAE DNA AMP PROB: CPT | Performed by: OBSTETRICS & GYNECOLOGY

## 2024-10-29 RX ORDER — DROSPIRENONE AND ETHINYL ESTRADIOL 0.02-3(28)
1 KIT ORAL DAILY
Qty: 84 TABLET | Refills: 3 | Status: SHIPPED | OUTPATIENT
Start: 2024-10-29

## 2024-10-29 RX ORDER — TRIFAROTENE 50 UG/G
CREAM TOPICAL
COMMUNITY
Start: 2024-10-18

## 2024-10-29 NOTE — PROGRESS NOTES
Katia Wilhelm  2000      CC:  Yearly exam    S:  24 y.o. female here for yearly exam.     Period Cycle (Days): 28  Period Duration (Days): 3  Period Pattern: Regular  Menstrual Flow: Moderate  Menstrual Control: Tampon, Maxi pad  Menstrual Control Change Freq (Hours): 4-5    She denies vaginal discharge, itching, pelvic pain.    Last visit was seen for pelvic pain - this essentially resolved on its own.     She has no urinary concerns, does not have incontinence.  No bowel concerns.  No breast concerns.   Has had a breast reduction since last visit.     Sexual activity: She is sexually active 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, bleeding or dryness.   She is interested in STD screening today.     Contraception: She uses pull out for contraception.  Does not plan pregnancy in the next 12 months.     Last Pap: 4/29/21 - NILM    We reviewed ASC guidelines for Pap testing today.     Family hx of breast cancer: no  Family hx of ovarian cancer: no  Family hx of colon cancer: no      Current Outpatient Medications:     Aklief 0.005 % CREA, , Disp: , Rfl:     clindamycin (CLEOCIN T) 1 % lotion, apply thin layer to face twice a day, Disp: , Rfl:     drospirenone-ethinyl estradiol (LEODAN) 3-0.02 MG per tablet, Take 1 tablet by mouth daily, Disp: 84 tablet, Rfl: 3    spironolactone (ALDACTONE) 50 mg tablet, TAKE ONE TABLET FOR THE FIRST 5 NIGHTS, THEN INCREASE TO TWO TABLETS NIGHTLY, Disp: , Rfl:     venlafaxine (EFFEXOR-XR) 75 mg 24 hr capsule, TAKE 3 CAPSULES BY MOUTH DAILY., Disp: 90 capsule, Rfl: 5  Patient Active Problem List   Diagnosis    Hyperhidrosis    Kyphosis    Seasonal affective disorder (HCC)    Vitamin D deficiency    Chronic nonintractable headache    Obsessive compulsive disorder    Obesity (BMI 30-39.9)     Past Medical History:   Diagnosis Date    Allergy     last assessed: 12/4/2013    Anxiety     Depression     Dysuria     last assessed: 3/5/2014    Kyphosis, acquired     last assessed: 1/17/2018    Murmur     last assessed:  "6/24/2014    Nicotine use disorder 7/12/2021     Family History   Problem Relation Age of Onset    Depression Father     Heart disease Maternal Grandmother     Heart disease Paternal Grandfather       Review of Systems   Respiratory: Negative.    Cardiovascular: Negative.    Gastrointestinal: Negative for constipation and diarrhea.     O:  Blood pressure 102/76, height 5' 4.5\" (1.638 m), weight 91.3 kg (201 lb 3.2 oz), last menstrual period 10/22/2024.    Patient appears well and is not in distress  Breasts are symmetrical without mass, tenderness, nipple discharge, skin changes or adenopathy.   Abdomen is soft and nontender without masses.   External genitals are normal without lesions or rashes.  Urethral meatus and urethra are normal  Bladder is normal to palpation  Vagina is normal without discharge or bleeding.   Cervix is normal without discharge or lesion.   Uterus is normal, mobile, nontender without palpable mass.  Adnexa are normal, nontender, without palpable mass.     A:  Yearly exam.     P:   Pap today with STD testing, declines bloodwork at this time   Contraception - Pavithra ordered, to check potassium level due to aldactone use ( to be done prior to 3 month check).    RTO one year for yearly exam or sooner as needed.      "

## 2024-10-31 LAB
C TRACH DNA SPEC QL NAA+PROBE: NEGATIVE
N GONORRHOEA DNA SPEC QL NAA+PROBE: NEGATIVE

## 2024-11-01 LAB
LAB AP GYN PRIMARY INTERPRETATION: NORMAL
Lab: NORMAL

## 2024-12-13 ENCOUNTER — VBI (OUTPATIENT)
Dept: ADMINISTRATIVE | Facility: OTHER | Age: 24
End: 2024-12-13

## 2024-12-13 NOTE — TELEPHONE ENCOUNTER
12/13/24 4:37 PM     Chart reviewed for Pap Smear (HPV) aka Cervical Cancer Screening was/were submitted to the patient's insurance.     Radha Andrews MA   [facility]

## 2025-01-22 ENCOUNTER — OFFICE VISIT (OUTPATIENT)
Age: 25
End: 2025-01-22
Payer: COMMERCIAL

## 2025-01-22 VITALS
BODY MASS INDEX: 34.26 KG/M2 | DIASTOLIC BLOOD PRESSURE: 74 MMHG | HEIGHT: 65 IN | SYSTOLIC BLOOD PRESSURE: 102 MMHG | WEIGHT: 205.6 LBS

## 2025-01-22 DIAGNOSIS — Z30.41 ORAL CONTRACEPTIVE USE: Primary | ICD-10-CM

## 2025-01-22 PROCEDURE — 99212 OFFICE O/P EST SF 10 MIN: CPT | Performed by: OBSTETRICS & GYNECOLOGY

## 2025-01-22 NOTE — PROGRESS NOTES
Katia Wilhelm  2000      S:   24 y.o. female here for pill check. She has been on Leodan for the past 3-4 months. She is doing well without irregular bleeding, cramping, breast tenderness, moodiness or acne.  She has had no increase in headaches. She is very happy with this method and wishes to continue.     Current Outpatient Medications:     Aklief 0.005 % CREA, , Disp: , Rfl:     clindamycin (CLEOCIN T) 1 % lotion, apply thin layer to face twice a day, Disp: , Rfl:     drospirenone-ethinyl estradiol (LEODAN) 3-0.02 MG per tablet, Take 1 tablet by mouth daily, Disp: 84 tablet, Rfl: 3    spironolactone (ALDACTONE) 50 mg tablet, TAKE ONE TABLET FOR THE FIRST 5 NIGHTS, THEN INCREASE TO TWO TABLETS NIGHTLY, Disp: , Rfl:     venlafaxine (EFFEXOR-XR) 75 mg 24 hr capsule, TAKE 3 CAPSULES BY MOUTH DAILY., Disp: 90 capsule, Rfl: 5  Social History     Socioeconomic History    Marital status: Single     Spouse name: Not on file    Number of children: Not on file    Years of education: Not on file    Highest education level: Not on file   Occupational History    Not on file   Tobacco Use    Smoking status: Never    Smokeless tobacco: Never   Vaping Use    Vaping status: Never Used   Substance and Sexual Activity    Alcohol use: Not Currently     Alcohol/week: 2.0 standard drinks of alcohol     Types: 2 Standard drinks or equivalent per week     Comment: socially    Drug use: No    Sexual activity: Yes     Partners: Male     Birth control/protection: OCP   Other Topics Concern    Not on file   Social History Narrative    Not on file     Social Drivers of Health     Financial Resource Strain: Not on file   Food Insecurity: Not on file   Transportation Needs: Not on file   Physical Activity: Not on file   Stress: Not on file   Social Connections: Not on file   Intimate Partner Violence: Not on file   Housing Stability: Not on file     Family History   Problem Relation Age of Onset    Depression Father     Heart disease Maternal  "Grandmother     Heart disease Paternal Grandfather      Past Medical History:   Diagnosis Date    Allergy     last assessed: 12/4/2013    Anxiety     Depression     Dysuria     last assessed: 3/5/2014    Kyphosis, acquired     last assessed: 1/17/2018    Murmur     last assessed: 6/24/2014    Nicotine use disorder 7/12/2021         O:   Blood pressure 102/74, height 5' 4.5\" (1.638 m), weight 93.3 kg (205 lb 9.6 oz), last menstrual period 01/19/2025.      A:  Contraception.     P:  Continue method as described above.  Return in 9 months for yearly exam or sooner PRN.    Check BMP - she will go tomorrow!      "

## 2025-01-24 ENCOUNTER — APPOINTMENT (OUTPATIENT)
Dept: LAB | Facility: CLINIC | Age: 25
End: 2025-01-24
Payer: COMMERCIAL

## 2025-01-24 DIAGNOSIS — Z51.81 ENCOUNTER FOR MEDICATION MONITORING: ICD-10-CM

## 2025-01-24 LAB
ANION GAP SERPL CALCULATED.3IONS-SCNC: 12 MMOL/L (ref 4–13)
BUN SERPL-MCNC: 18 MG/DL (ref 5–25)
CALCIUM SERPL-MCNC: 9.7 MG/DL (ref 8.4–10.2)
CHLORIDE SERPL-SCNC: 105 MMOL/L (ref 96–108)
CO2 SERPL-SCNC: 24 MMOL/L (ref 21–32)
CREAT SERPL-MCNC: 0.95 MG/DL (ref 0.6–1.3)
GFR SERPL CREATININE-BSD FRML MDRD: 84 ML/MIN/1.73SQ M
GLUCOSE P FAST SERPL-MCNC: 75 MG/DL (ref 65–99)
POTASSIUM SERPL-SCNC: 4.3 MMOL/L (ref 3.5–5.3)
SODIUM SERPL-SCNC: 141 MMOL/L (ref 135–147)

## 2025-01-24 PROCEDURE — 36415 COLL VENOUS BLD VENIPUNCTURE: CPT

## 2025-01-24 PROCEDURE — 80048 BASIC METABOLIC PNL TOTAL CA: CPT

## 2025-02-03 ENCOUNTER — RESULTS FOLLOW-UP (OUTPATIENT)
Dept: LABOR AND DELIVERY | Facility: HOSPITAL | Age: 25
End: 2025-02-03

## 2025-02-08 DIAGNOSIS — Z30.011 INITIATION OF ORAL CONTRACEPTION: ICD-10-CM

## 2025-02-10 RX ORDER — DROSPIRENONE AND ETHINYL ESTRADIOL 0.02-3(28)
1 KIT ORAL DAILY
Qty: 84 TABLET | Refills: 1 | Status: SHIPPED | OUTPATIENT
Start: 2025-02-10

## 2025-04-06 DIAGNOSIS — F33.42 MAJOR DEPRESSIVE DISORDER, RECURRENT, IN FULL REMISSION (HCC): ICD-10-CM

## 2025-04-07 RX ORDER — VENLAFAXINE HYDROCHLORIDE 75 MG/1
225 CAPSULE, EXTENDED RELEASE ORAL DAILY
Qty: 90 CAPSULE | Refills: 5 | Status: SHIPPED | OUTPATIENT
Start: 2025-04-07 | End: 2025-04-08 | Stop reason: SDUPTHER

## 2025-04-08 ENCOUNTER — OFFICE VISIT (OUTPATIENT)
Dept: FAMILY MEDICINE CLINIC | Facility: CLINIC | Age: 25
End: 2025-04-08
Payer: COMMERCIAL

## 2025-04-08 VITALS
SYSTOLIC BLOOD PRESSURE: 110 MMHG | HEIGHT: 65 IN | RESPIRATION RATE: 16 BRPM | WEIGHT: 204 LBS | HEART RATE: 78 BPM | OXYGEN SATURATION: 97 % | DIASTOLIC BLOOD PRESSURE: 60 MMHG | TEMPERATURE: 96.9 F | BODY MASS INDEX: 33.99 KG/M2

## 2025-04-08 DIAGNOSIS — E66.9 OBESITY (BMI 30-39.9): ICD-10-CM

## 2025-04-08 DIAGNOSIS — Z13.220 SCREENING FOR HYPERLIPIDEMIA: ICD-10-CM

## 2025-04-08 DIAGNOSIS — F33.42 MAJOR DEPRESSIVE DISORDER, RECURRENT, IN FULL REMISSION (HCC): Primary | ICD-10-CM

## 2025-04-08 DIAGNOSIS — F33.8 SEASONAL AFFECTIVE DISORDER (HCC): ICD-10-CM

## 2025-04-08 DIAGNOSIS — E55.9 VITAMIN D DEFICIENCY: ICD-10-CM

## 2025-04-08 DIAGNOSIS — F42.2 MIXED OBSESSIONAL THOUGHTS AND ACTS: ICD-10-CM

## 2025-04-08 PROCEDURE — 99214 OFFICE O/P EST MOD 30 MIN: CPT | Performed by: FAMILY MEDICINE

## 2025-04-08 RX ORDER — VENLAFAXINE HYDROCHLORIDE 75 MG/1
225 CAPSULE, EXTENDED RELEASE ORAL DAILY
Qty: 90 CAPSULE | Refills: 3 | Status: SHIPPED | OUTPATIENT
Start: 2025-04-08

## 2025-04-08 NOTE — PATIENT INSTRUCTIONS
Continue same effexor dose and will call if any problems. Use sunscreen and monitor for ticks. Take vitamin d as directed. Recheck in 6 months. Check labs.

## 2025-04-08 NOTE — ASSESSMENT & PLAN NOTE
Take vitamin D3 as directed  Orders:    Comprehensive metabolic panel; Future    Vitamin D 25 hydroxy; Future

## 2025-04-08 NOTE — PROGRESS NOTES
Name: Katia Wilhelm      : 2000      MRN: [id number]  Encounter Provider: Tory Reyes DO  Encounter Date: 2025   Encounter department: [facility] PRIMARY CARE  Chief Complaint   Patient presents with    Follow-up     6 month recheck for depression      Patient Instructions   Continue same effexor dose and will call if any problems. Use sunscreen and monitor for ticks. Take vitamin d as directed. Recheck in 6 months. Check labs.     Assessment & Plan  Major depressive disorder, recurrent, in full remission (HCC)  stable    Orders:    venlafaxine (EFFEXOR-XR) 75 mg 24 hr capsule; Take 3 capsules (225 mg total) by mouth daily    Comprehensive metabolic panel; Future    CBC and differential; Future    Seasonal affective disorder (HCC)  stable         Mixed obsessional thoughts and acts  stable       Vitamin D deficiency  Take vitamin D3 as directed  Orders:    Comprehensive metabolic panel; Future    Vitamin D 25 hydroxy; Future    Obesity (BMI 30-39.9)  Lose weight as directed to get BMI lower than 25.     Orders:    Comprehensive metabolic panel; Future    CBC and differential; Future    Lipid Panel with Direct LDL reflex; Future    Vitamin D 25 hydroxy; Future    Screening for hyperlipidemia    Orders:    Comprehensive metabolic panel; Future    Lipid Panel with Direct LDL reflex; Future      Depression Screening and Follow-up Plan: Patient was screened for depression during today's encounter. They screened negative with a PHQ-9 score of 2.          History of Present Illness     HPI  Review of Systems  Past Medical History:   Diagnosis Date    Allergy     last assessed: 2013    Anxiety     Depression     Dysuria     last assessed: 3/5/2014    Kyphosis, acquired     last assessed: 2018    Murmur     last assessed: 2014    Nicotine use disorder 2021     Past Surgical History:   Procedure Laterality Date    NV BREAST REDUCTION Bilateral 4/3/2024    Procedure: BREAST  REDUCTION;  Surgeon: Brea England MD;  Location:  MAIN OR;  Service: Plastics    SC SUCTION ASSISTED LIPECTOMY TRUNK N/A 4/3/2024    Procedure: LIPOSUCTION OF UPPER BACK;  Surgeon: Brea England MD;  Location:  MAIN OR;  Service: Plastics    WISDOM TOOTH EXTRACTION       Family History   Problem Relation Age of Onset    Depression Father     Heart disease Maternal Grandmother     Heart disease Paternal Grandfather      Social History     Tobacco Use    Smoking status: Never    Smokeless tobacco: Never   Vaping Use    Vaping status: Never Used   Substance and Sexual Activity    Alcohol use: Not Currently     Alcohol/week: 2.0 standard drinks of alcohol     Types: 2 Standard drinks or equivalent per week     Comment: socially    Drug use: No    Sexual activity: Yes     Partners: Male     Birth control/protection: OCP     Current Outpatient Medications on File Prior to Visit   Medication Sig    Aklief 0.005 % CREA     clindamycin (CLEOCIN T) 1 % lotion apply thin layer to face twice a day    drospirenone-ethinyl estradiol (Vestura) 3-0.02 MG per tablet TAKE 1 TABLET BY MOUTH EVERY DAY    spironolactone (ALDACTONE) 50 mg tablet TAKE ONE TABLET FOR THE FIRST 5 NIGHTS, THEN INCREASE TO TWO TABLETS NIGHTLY    [DISCONTINUED] venlafaxine (EFFEXOR-XR) 75 mg 24 hr capsule TAKE 3 CAPSULES BY MOUTH DAILY.     Allergies   Allergen Reactions    Pineapple - Food Allergy      Other reaction(s): Tounge and throat swell    Sulfa Antibiotics Rash and Hives     Immunization History   Administered Date(s) Administered    COVID-19 PFIZER VACCINE 0.3 ML IM 12/28/2020, 01/18/2021, 12/18/2021    COVID-19 Pfizer Vac BIVALENT Shorty-sucrose 12 Yr+ IM 09/13/2022    COVID-19 Pfizer mRNA vacc PF shorty-sucrose 12 yr and older (Comirnaty) 01/20/2025    DTaP 5 2000, 2000, 2000, 10/18/2001, 05/04/2005    Hep B, Adolescent or Pediatric 06/25/2001    Hepatitis A 07/24/2012    Hib (PRP-OMP) 2000, 2000, 2000     "INFLUENZA 10/02/2019, 09/30/2020    IPV 2000, 2000, 10/18/2001, 05/04/2005    Influenza Injectable, MDCK, Preservative Free, Quadrivalent, 0.5 mL 09/13/2022    Influenza, seasonal, injectable 04/30/2008    Influenza, seasonal, injectable, preservative free 10/08/2024    MMR 06/25/2001, 05/04/2005    Meningococcal MCV4, Unspecified 07/24/2012, 07/07/2017    Meningococcal, Unknown Serogroups 07/24/2012, 07/07/2017    Tdap 07/24/2012    Tuberculin Skin Test-PPD Intradermal 07/23/2019, 08/02/2019    Varicella 03/21/2001     Objective   /60   Pulse 78   Temp (!) 96.9 °F (36.1 °C) (Temporal)   Resp 16   Ht 5' 4.5\" (1.638 m)   Wt 92.5 kg (204 lb)   SpO2 97%   BMI 34.48 kg/m²     Physical Exam  Constitutional:       Appearance: She is well-developed. She is obese.   HENT:      Head: Normocephalic and atraumatic.      Right Ear: External ear normal.      Left Ear: External ear normal.      Nose: Nose normal.      Mouth/Throat:      Mouth: Mucous membranes are moist.   Eyes:      Conjunctiva/sclera: Conjunctivae normal.      Pupils: Pupils are equal, round, and reactive to light.   Cardiovascular:      Rate and Rhythm: Normal rate and regular rhythm.      Pulses: Normal pulses.      Heart sounds: Normal heart sounds.   Pulmonary:      Effort: Pulmonary effort is normal.      Breath sounds: Normal breath sounds.   Musculoskeletal:         General: Normal range of motion.      Cervical back: Normal range of motion and neck supple.   Skin:     General: Skin is warm and dry.   Neurological:      General: No focal deficit present.      Mental Status: She is alert and oriented to person, place, and time. Mental status is at baseline.      Deep Tendon Reflexes: Reflexes are normal and symmetric.   Psychiatric:         Mood and Affect: Mood normal.         Behavior: Behavior normal.         Thought Content: Thought content normal.         Judgment: Judgment normal.      Comments: SAD stable and moods stable " on med       Administrative Statements   I have spent a total time of 30 minutes in caring for this patient on the day of the visit/encounter including Diagnostic results, Prognosis, Risks and benefits of tx options, Instructions for management, Patient and family education, Importance of tx compliance, Risk factor reductions, Impressions, Counseling / Coordination of care, Documenting in the medical record, Reviewing/placing orders in the medical record (including tests, medications, and/or procedures), and Obtaining or reviewing history  .

## 2025-04-08 NOTE — ASSESSMENT & PLAN NOTE
Lose weight as directed to get BMI lower than 25.     Orders:    Comprehensive metabolic panel; Future    CBC and differential; Future    Lipid Panel with Direct LDL reflex; Future    Vitamin D 25 hydroxy; Future

## 2025-04-30 DIAGNOSIS — F33.42 MAJOR DEPRESSIVE DISORDER, RECURRENT, IN FULL REMISSION (HCC): ICD-10-CM

## 2025-05-01 RX ORDER — VENLAFAXINE HYDROCHLORIDE 75 MG/1
225 CAPSULE, EXTENDED RELEASE ORAL DAILY
Qty: 270 CAPSULE | Refills: 2 | Status: SHIPPED | OUTPATIENT
Start: 2025-05-01

## 2025-07-27 DIAGNOSIS — Z30.011 INITIATION OF ORAL CONTRACEPTION: ICD-10-CM

## 2025-07-28 RX ORDER — DROSPIRENONE AND ETHINYL ESTRADIOL 0.02-3(28)
1 KIT ORAL DAILY
Qty: 84 TABLET | Refills: 1 | Status: SHIPPED | OUTPATIENT
Start: 2025-07-28

## (undated) DEVICE — BULB SYRINGE,IRRIGATION WITH PROTECTIVE CAP: Brand: DOVER

## (undated) DEVICE — STERILE POLYISOPRENE POWDER-FREE SURGICAL GLOVES: Brand: PROTEXIS

## (undated) DEVICE — TUBING LIPOSUCTION ASPIRATION 12FT STERILE

## (undated) DEVICE — EXOFIN PRECISION PEN HIGH VISCOSITY TOPICAL SKIN ADHESIVE: Brand: EXOFIN PRECISION PEN, 1G

## (undated) DEVICE — CANNULA 4MM HELIXED TRI-PORT II 30CM 10MM PORT

## (undated) DEVICE — CHLORAPREP HI-LITE 26ML ORANGE

## (undated) DEVICE — ASTOUND STANDARD SURGICAL GOWN, XL: Brand: CONVERTORS

## (undated) DEVICE — INTENDED FOR TISSUE SEPARATION, AND OTHER PROCEDURES THAT REQUIRE A SHARP SURGICAL BLADE TO PUNCTURE OR CUT.: Brand: BARD-PARKER ® SAFETYLOCK CARBON RIB-BACK BLADES

## (undated) DEVICE — CHEST/BREAST DRAPE: Brand: CONVERTORS

## (undated) DEVICE — PACK UNIVERSAL DRAPES SUB-Q ICD

## (undated) DEVICE — DISPOSABLE OR TOWEL: Brand: CARDINAL HEALTH

## (undated) DEVICE — STERILE POLYISOPRENE POWDER-FREE SURGICAL GLOVES WITH EMOLLIENT COATING: Brand: PROTEXIS

## (undated) DEVICE — DECANTER: Brand: UNBRANDED

## (undated) DEVICE — NEEDLE 23G X 1 1/2 SAFETY-GLIDE THIN WALL

## (undated) DEVICE — KERLIX BANDAGE ROLL: Brand: KERLIX

## (undated) DEVICE — 1820 FOAM BLOCK NEEDLE COUNTER: Brand: DEVON

## (undated) DEVICE — TUBING SUCTION 5MM X 12 FT

## (undated) DEVICE — SUT MONOCRYL 3-0 PS-2 27 IN Y427H

## (undated) DEVICE — STANDARD SURGICAL GOWN, L: Brand: CONVERTORS

## (undated) DEVICE — LIGHT GLOVE GREEN

## (undated) DEVICE — DRAPE SHEET THREE QUARTER

## (undated) DEVICE — SYRINGE 30ML LL

## (undated) DEVICE — BASIC SINGLE BASIN-LF: Brand: MEDLINE INDUSTRIES, INC.

## (undated) DEVICE — MEDI-VAC YANK SUCT HNDL W/TPRD BULBOUS TIP: Brand: CARDINAL HEALTH

## (undated) DEVICE — SYRINGE 20ML LL

## (undated) DEVICE — TUBING INFILTRATION 9FT

## (undated) DEVICE — UNDYED MONOFILAMENT (POLYDIOXANONE), ABSORBABLE SURGICAL SUTURE: Brand: PDS

## (undated) DEVICE — SPONGE LAP 18 X 18 IN STRL RFD

## (undated) DEVICE — SUT VICRYL 2-0 SH 27 IN UNDYED J417H

## (undated) DEVICE — DRAPE TOWEL: Brand: CONVERTORS

## (undated) DEVICE — TRAY FOLEY 16FR URIMETER SURESTEP

## (undated) DEVICE — ABDOMINAL PAD: Brand: DERMACEA

## (undated) DEVICE — CANNISTER WASTE IMPLOSION PROOF

## (undated) DEVICE — NEEDLE BLUNT 18 G X 1 1/2IN

## (undated) DEVICE — SCD SEQUENTIAL COMPRESSION COMFORT SLEEVE MEDIUM KNEE LENGTH: Brand: KENDALL SCD

## (undated) DEVICE — NEPTUNE E-SEP SMOKE EVACUATION PENCIL, COATED, 70MM BLADE, PUSH BUTTON SWITCH: Brand: NEPTUNE E-SEP

## (undated) DEVICE — SKIN MARKER DUAL TIP WITH RULER CAP, FLEXIBLE RULER AND LABELS: Brand: DEVON